# Patient Record
Sex: FEMALE | Race: WHITE | Employment: OTHER | URBAN - METROPOLITAN AREA
[De-identification: names, ages, dates, MRNs, and addresses within clinical notes are randomized per-mention and may not be internally consistent; named-entity substitution may affect disease eponyms.]

---

## 2017-02-28 ENCOUNTER — OFFICE VISIT (OUTPATIENT)
Dept: OBGYN CLINIC | Facility: CLINIC | Age: 52
End: 2017-02-28

## 2017-02-28 VITALS
HEIGHT: 67 IN | DIASTOLIC BLOOD PRESSURE: 66 MMHG | SYSTOLIC BLOOD PRESSURE: 118 MMHG | WEIGHT: 237 LBS | BODY MASS INDEX: 37.2 KG/M2

## 2017-02-28 DIAGNOSIS — Z01.419 ENCOUNTER FOR WELL WOMAN EXAM WITH ROUTINE GYNECOLOGICAL EXAM: Primary | ICD-10-CM

## 2017-02-28 PROCEDURE — 88175 CYTOPATH C/V AUTO FLUID REDO: CPT | Performed by: OBSTETRICS & GYNECOLOGY

## 2017-02-28 PROCEDURE — 87624 HPV HI-RISK TYP POOLED RSLT: CPT | Performed by: OBSTETRICS & GYNECOLOGY

## 2017-02-28 PROCEDURE — 99396 PREV VISIT EST AGE 40-64: CPT | Performed by: OBSTETRICS & GYNECOLOGY

## 2017-02-28 RX ORDER — SPIRONOLACTONE 25 MG/1
TABLET ORAL
Refills: 0 | COMMUNITY
Start: 2017-01-20 | End: 2018-03-01

## 2017-02-28 NOTE — PROGRESS NOTES
Marc Medeiros is a 49year old female No obstetric history on file. Patient's last menstrual period was 06/01/2016 (approximate).  Patient presents with:  Wellness Visit: annual exam  .  No complaints, LMP-6/10/16- discussed menopause    OBSTETRICS HIS back pain. Skin/Breast:  Denies any breast pain, lumps, or discharge. Neurological:  denies headaches, extremity weakness or numbness. Psychiatric: denies depression or anxiety. Endocrine:   denies excessive thirst or urination.   Heme/Lymph:  denies h

## 2017-03-02 LAB — HPV I/H RISK 1 DNA SPEC QL NAA+PROBE: NEGATIVE

## 2017-06-11 ENCOUNTER — HOSPITAL ENCOUNTER (OUTPATIENT)
Age: 52
Discharge: HOME | End: 2017-06-11
Attending: EMERGENCY MEDICINE
Payer: COMMERCIAL

## 2017-06-11 VITALS
HEART RATE: 88 BPM | BODY MASS INDEX: 36 KG/M2 | SYSTOLIC BLOOD PRESSURE: 132 MMHG | TEMPERATURE: 99 F | DIASTOLIC BLOOD PRESSURE: 84 MMHG | OXYGEN SATURATION: 97 % | RESPIRATION RATE: 16 BRPM | WEIGHT: 230 LBS

## 2017-06-11 DIAGNOSIS — M54.16 LUMBAR RADICULOPATHY: Primary | ICD-10-CM

## 2017-06-11 PROCEDURE — 87086 URINE CULTURE/COLONY COUNT: CPT | Performed by: EMERGENCY MEDICINE

## 2017-06-11 PROCEDURE — 99214 OFFICE O/P EST MOD 30 MIN: CPT

## 2017-06-11 PROCEDURE — 99204 OFFICE O/P NEW MOD 45 MIN: CPT

## 2017-06-11 PROCEDURE — 81002 URINALYSIS NONAUTO W/O SCOPE: CPT | Performed by: EMERGENCY MEDICINE

## 2017-06-11 RX ORDER — CYCLOBENZAPRINE HCL 5 MG
TABLET ORAL 3 TIMES DAILY PRN
Qty: 30 TABLET | Refills: 0 | Status: SHIPPED | OUTPATIENT
Start: 2017-06-11 | End: 2018-03-01

## 2017-06-11 NOTE — ED PROVIDER NOTES
Patient presents with: Other    HPI:     Chet Tenorio is a 49year old female who presents with chief complaint of leg pain. On 6/7 pt developed a headache that lasted for several days. She states it has since resolved.   However on the second da 5/5.    Diagnostics:     Labs Reviewed   POCT URINALYSIS DIPSTICK - Abnormal; Notable for the following:     Urine Clarity Cloudy (*)     Blood, Urine Trace-lysed (*)     Leukocyte esterase urine Trace (*)     All other components within normal limits   UR

## 2017-06-11 NOTE — ED INITIAL ASSESSMENT (HPI)
Patient c/o RLS type symptoms that started last Thursday. States at night shell be laying in bed and the urge to move around or move her legs starts up and causes her to not sleep.  Patient also mentions she thinks she may have had a migraine last Wednesday

## 2018-03-01 ENCOUNTER — OFFICE VISIT (OUTPATIENT)
Dept: OBGYN CLINIC | Facility: CLINIC | Age: 53
End: 2018-03-01

## 2018-03-01 VITALS
WEIGHT: 236 LBS | RESPIRATION RATE: 18 BRPM | SYSTOLIC BLOOD PRESSURE: 130 MMHG | HEIGHT: 67 IN | BODY MASS INDEX: 37.04 KG/M2 | DIASTOLIC BLOOD PRESSURE: 82 MMHG | HEART RATE: 88 BPM

## 2018-03-01 DIAGNOSIS — Z12.4 CERVICAL CANCER SCREENING: ICD-10-CM

## 2018-03-01 DIAGNOSIS — Z12.39 BREAST CANCER SCREENING: ICD-10-CM

## 2018-03-01 DIAGNOSIS — Z01.419 ENCOUNTER FOR WELL WOMAN EXAM WITH ROUTINE GYNECOLOGICAL EXAM: Primary | ICD-10-CM

## 2018-03-01 PROCEDURE — 88175 CYTOPATH C/V AUTO FLUID REDO: CPT | Performed by: OBSTETRICS & GYNECOLOGY

## 2018-03-01 PROCEDURE — 99396 PREV VISIT EST AGE 40-64: CPT | Performed by: OBSTETRICS & GYNECOLOGY

## 2018-03-01 PROCEDURE — 87624 HPV HI-RISK TYP POOLED RSLT: CPT | Performed by: OBSTETRICS & GYNECOLOGY

## 2018-03-01 NOTE — PROGRESS NOTES
Nelia Hong is a 47year old female No obstetric history on file. Patient's last menstrual period was 10/01/2017 (approximate).  Patient presents with:  Wellness Visit: annual  .  C/o hot flashes and night sweats for the past year, would like natur denies back pain. Skin/Breast:  Denies any breast pain, lumps, or discharge. Neurological:  denies headaches, extremity weakness or numbness. Psychiatric: denies depression or anxiety. Endocrine:   denies excessive thirst or urination.   Heme/Lymph:

## 2018-03-02 LAB — HPV I/H RISK 1 DNA SPEC QL NAA+PROBE: NEGATIVE

## 2018-05-02 ENCOUNTER — HOSPITAL ENCOUNTER (OUTPATIENT)
Dept: MAMMOGRAPHY | Age: 53
Discharge: HOME | End: 2018-05-02
Attending: OBSTETRICS & GYNECOLOGY
Payer: COMMERCIAL

## 2018-05-02 DIAGNOSIS — Z12.39 BREAST CANCER SCREENING: ICD-10-CM

## 2018-05-02 PROCEDURE — 77067 SCR MAMMO BI INCL CAD: CPT | Performed by: OBSTETRICS & GYNECOLOGY

## 2019-03-07 ENCOUNTER — OFFICE VISIT (OUTPATIENT)
Dept: OBGYN CLINIC | Facility: CLINIC | Age: 54
End: 2019-03-07
Payer: COMMERCIAL

## 2019-03-07 VITALS
HEIGHT: 67 IN | SYSTOLIC BLOOD PRESSURE: 128 MMHG | HEART RATE: 96 BPM | BODY MASS INDEX: 37.04 KG/M2 | DIASTOLIC BLOOD PRESSURE: 68 MMHG | WEIGHT: 236 LBS

## 2019-03-07 DIAGNOSIS — Z12.4 CERVICAL CANCER SCREENING: ICD-10-CM

## 2019-03-07 DIAGNOSIS — Z12.39 BREAST CANCER SCREENING: ICD-10-CM

## 2019-03-07 DIAGNOSIS — Z01.419 ENCOUNTER FOR WELL WOMAN EXAM WITH ROUTINE GYNECOLOGICAL EXAM: Primary | ICD-10-CM

## 2019-03-07 PROCEDURE — 88175 CYTOPATH C/V AUTO FLUID REDO: CPT | Performed by: OBSTETRICS & GYNECOLOGY

## 2019-03-07 PROCEDURE — 99396 PREV VISIT EST AGE 40-64: CPT | Performed by: OBSTETRICS & GYNECOLOGY

## 2019-03-07 PROCEDURE — 87624 HPV HI-RISK TYP POOLED RSLT: CPT | Performed by: OBSTETRICS & GYNECOLOGY

## 2019-03-07 RX ORDER — BETAMETHASONE DIPROPIONATE 0.5 MG/G
CREAM TOPICAL
COMMUNITY
Start: 2017-03-09 | End: 2019-09-17 | Stop reason: ALTCHOICE

## 2019-03-07 NOTE — PROGRESS NOTES
Angel Smith is a 47year old female  Patient's last menstrual period was 10/17/2018 (approximate). Patient presents with:  Wellness Visit  .   No complaints, taking Relizen for hot flashes, helps a lot  OBSTETRICS HISTORY:  OB History   Grav file        Attends meetings of clubs or organizations: Not on file        Relationship status: Not on file      Intimate partner violence:        Fear of current or ex partner: Not on file        Emotionally abused: Not on file        Physically abused: N pain, lumps, or discharge. Neurological:  denies headaches, extremity weakness or numbness. Psychiatric: denies depression or anxiety. Endocrine:   denies excessive thirst or urination. Heme/Lymph:  denies history of anemia, easy bruising or bleeding.

## 2019-03-08 LAB — HPV I/H RISK 1 DNA SPEC QL NAA+PROBE: NEGATIVE

## 2019-05-02 ENCOUNTER — OFFICE VISIT (OUTPATIENT)
Dept: INTERNAL MEDICINE CLINIC | Facility: CLINIC | Age: 54
End: 2019-05-02
Payer: COMMERCIAL

## 2019-05-02 VITALS
BODY MASS INDEX: 38.17 KG/M2 | SYSTOLIC BLOOD PRESSURE: 131 MMHG | HEIGHT: 66.25 IN | RESPIRATION RATE: 18 BRPM | HEART RATE: 73 BPM | WEIGHT: 237.5 LBS | DIASTOLIC BLOOD PRESSURE: 86 MMHG | TEMPERATURE: 99 F

## 2019-05-02 DIAGNOSIS — L92.0 GRANULOMA ANNULARE: ICD-10-CM

## 2019-05-02 DIAGNOSIS — Z12.31 VISIT FOR SCREENING MAMMOGRAM: ICD-10-CM

## 2019-05-02 DIAGNOSIS — J34.3 NASAL TURBINATE HYPERTROPHY: Primary | ICD-10-CM

## 2019-05-02 DIAGNOSIS — E04.1 THYROID NODULE: ICD-10-CM

## 2019-05-02 DIAGNOSIS — E66.9 OBESITY (BMI 35.0-39.9 WITHOUT COMORBIDITY): ICD-10-CM

## 2019-05-02 DIAGNOSIS — I10 ESSENTIAL HYPERTENSION: ICD-10-CM

## 2019-05-02 PROCEDURE — 99212 OFFICE O/P EST SF 10 MIN: CPT | Performed by: INTERNAL MEDICINE

## 2019-05-02 PROCEDURE — 99205 OFFICE O/P NEW HI 60 MIN: CPT | Performed by: INTERNAL MEDICINE

## 2019-05-02 RX ORDER — TURMERIC 100 %
POWDER (GRAM) MISCELLANEOUS DAILY
COMMUNITY

## 2019-05-02 RX ORDER — AMLODIPINE BESYLATE 5 MG/1
5 TABLET ORAL DAILY
Qty: 30 TABLET | Refills: 3 | Status: SHIPPED | OUTPATIENT
Start: 2019-05-02 | End: 2019-08-06

## 2019-05-02 RX ORDER — PYRANTEL PAMOATE 100 %
POWDER (GRAM) MISCELLANEOUS DAILY
COMMUNITY
End: 2019-09-05

## 2019-05-02 RX ORDER — LISINOPRIL 5 MG/1
5 TABLET ORAL DAILY
COMMUNITY
End: 2019-07-02

## 2019-05-02 RX ORDER — UBIDECARENONE 200 MG
200 CAPSULE ORAL DAILY
COMMUNITY
End: 2019-10-22 | Stop reason: ALTCHOICE

## 2019-05-02 RX ORDER — BLUE-GREEN ALGAE 500 MG
1 TABLET ORAL DAILY
COMMUNITY
End: 2019-08-05

## 2019-05-02 NOTE — PROGRESS NOTES
HPI:    Patient ID: Dixon Moses is a 47year old female. New patient, establishment of care. Has brought in records from earlier 1425 Dequ[address] Morales, holistic physician–Dr. Nicole Weathers, dermatologist.  Recently diagnosed with granuloma annulare. Allergic/Immunologic: Negative. Neurological: Negative. Hematological: Negative. Psychiatric/Behavioral: Negative. Current Outpatient Medications:  lisinopril 5 MG Oral Tab Take 5 mg by mouth daily.  Disp:  Rfl:    Ascorbic Acid (C- no discharge. Neck: Normal range of motion. Neck supple. No JVD present. No thyromegaly present. Cardiovascular: Normal rate, regular rhythm, normal heart sounds and intact distal pulses. No murmur heard.   Pulmonary/Chest: Effort normal and breath so been on local application of steroids for the past 3 months without any significant improvement.            Relevant Orders    SED RATE, WESTERGREN (AUTOMATED)    ALEX, DIRECT SCREEN    Essential hypertension     Blood pressure 131/86, pulse 73, temperature weeks (around 6/13/2019).     PT UNDERSTANDS AND AGREES TO FOLLOW DIRECTIONS AND ADVICE    Orders Placed This Encounter      TSH W Reflex To Free T4 [E]      Thyroxine, Free [E]      Comp Metabolic Panel (14) [E]      CBC W Differential W Platelet [E]

## 2019-05-02 NOTE — ASSESSMENT & PLAN NOTE
Blood pressure 131/86, pulse 73, temperature 99.4 °F (37.4 °C), temperature source Oral, resp. rate 18, height 5' 6.25\" (1.683 m), weight 237 lb 8 oz (107.7 kg), last menstrual period 10/01/2018, not currently breastfeeding.      Pressures–systolic look st

## 2019-05-02 NOTE — PATIENT INSTRUCTIONS
Problem List Items Addressed This Visit        Unprioritized    Essential hypertension     Blood pressure 131/86, pulse 73, temperature 99.4 °F (37.4 °C), temperature source Oral, resp.  rate 18, height 5' 6.25\" (1.683 m), weight 237 lb 8 oz (107.7 kg), la Flonase if necessary. Obesity (BMI 35.0-39.9 without comorbidity)     Ongoing problems with weight gain– has just started the keto diet. Recommended low-fat keto. Discussed reduction of salt in the diet–olives, pickles, pretzels and pizza.   Reduc

## 2019-05-02 NOTE — ASSESSMENT & PLAN NOTE
Nasal congestion, recent sinus infection. On examination continued nasal turbinate hypertrophy noted. She has slight tenderness to palpation in the maxillary sinuses. She has had chronic pressure across her forehead.   She is advised to consider starting

## 2019-05-02 NOTE — ASSESSMENT & PLAN NOTE
Discussed granuloma annulare and autoimmune etiology. Patient has been advised to try changing the lisinopril to amlodipine at 5 mg daily. Consider a biopsy to evaluate for other causes for the rash.   She has been on local application of steroids for the

## 2019-05-02 NOTE — ASSESSMENT & PLAN NOTE
Ongoing problems with weight gain– has just started the keto diet. Recommended low-fat keto. Discussed reduction of salt in the diet–olives, pickles, pretzels and pizza. Reduce starches that is fruits, roots, grains in the diet.   Increase vegetables in

## 2019-05-02 NOTE — ASSESSMENT & PLAN NOTE
Patient has been evaluated per Dr. Bryce De La Garza in. Ultrasounds as well as labs have been stable. Continue to monitor.

## 2019-05-03 ENCOUNTER — MED REC SCAN ONLY (OUTPATIENT)
Dept: INTERNAL MEDICINE CLINIC | Facility: CLINIC | Age: 54
End: 2019-05-03

## 2019-05-03 RX ORDER — AMLODIPINE BESYLATE 5 MG/1
5 TABLET ORAL DAILY
Qty: 90 TABLET | Refills: 3 | OUTPATIENT
Start: 2019-05-03 | End: 2020-04-27

## 2019-05-13 ENCOUNTER — OFFICE VISIT (OUTPATIENT)
Dept: DERMATOLOGY | Age: 54
End: 2019-05-13

## 2019-05-13 DIAGNOSIS — L92.0 GA (GRANULOMA ANNULARE): Primary | ICD-10-CM

## 2019-05-13 PROCEDURE — 99213 OFFICE O/P EST LOW 20 MIN: CPT | Performed by: DERMATOLOGY

## 2019-05-13 RX ORDER — TURMERIC 100 %
POWDER (GRAM) MISCELLANEOUS
COMMUNITY

## 2019-05-13 RX ORDER — PYRANTEL PAMOATE 100 %
POWDER (GRAM) MISCELLANEOUS
COMMUNITY

## 2019-05-13 RX ORDER — MULTIVIT-MIN/IRON/FOLIC ACID/K 18-600-40
1 CAPSULE ORAL
COMMUNITY

## 2019-05-13 RX ORDER — UBIDECARENONE 200 MG
200 CAPSULE ORAL
COMMUNITY

## 2019-05-13 RX ORDER — AMLODIPINE BESYLATE 5 MG/1
5 TABLET ORAL
COMMUNITY
Start: 2019-05-02 | End: 2020-04-26

## 2019-05-13 RX ORDER — FOLIC ACID 0.8 MG
3 TABLET ORAL
COMMUNITY

## 2019-05-13 RX ORDER — BLUE-GREEN ALGAE 500 MG
1 TABLET ORAL
COMMUNITY

## 2019-06-27 ENCOUNTER — HOSPITAL ENCOUNTER (OUTPATIENT)
Dept: MAMMOGRAPHY | Facility: HOSPITAL | Age: 54
Discharge: HOME | End: 2019-06-27
Attending: INTERNAL MEDICINE
Payer: COMMERCIAL

## 2019-06-27 ENCOUNTER — LAB ENCOUNTER (OUTPATIENT)
Dept: LAB | Facility: HOSPITAL | Age: 54
End: 2019-06-27
Attending: INTERNAL MEDICINE
Payer: COMMERCIAL

## 2019-06-27 DIAGNOSIS — Z12.31 VISIT FOR SCREENING MAMMOGRAM: ICD-10-CM

## 2019-06-27 DIAGNOSIS — E04.1 THYROID NODULE: ICD-10-CM

## 2019-06-27 DIAGNOSIS — I10 ESSENTIAL HYPERTENSION: ICD-10-CM

## 2019-06-27 DIAGNOSIS — L92.0 GRANULOMA ANNULARE: ICD-10-CM

## 2019-06-27 PROCEDURE — 77063 BREAST TOMOSYNTHESIS BI: CPT | Performed by: INTERNAL MEDICINE

## 2019-06-27 PROCEDURE — 84439 ASSAY OF FREE THYROXINE: CPT

## 2019-06-27 PROCEDURE — 36415 COLL VENOUS BLD VENIPUNCTURE: CPT

## 2019-06-27 PROCEDURE — 85652 RBC SED RATE AUTOMATED: CPT

## 2019-06-27 PROCEDURE — 86039 ANTINUCLEAR ANTIBODIES (ANA): CPT

## 2019-06-27 PROCEDURE — 77067 SCR MAMMO BI INCL CAD: CPT | Performed by: INTERNAL MEDICINE

## 2019-06-27 PROCEDURE — 86038 ANTINUCLEAR ANTIBODIES: CPT

## 2019-06-27 PROCEDURE — 80061 LIPID PANEL: CPT

## 2019-06-27 PROCEDURE — 80053 COMPREHEN METABOLIC PANEL: CPT

## 2019-06-27 PROCEDURE — 85025 COMPLETE CBC W/AUTO DIFF WBC: CPT

## 2019-06-27 PROCEDURE — 84443 ASSAY THYROID STIM HORMONE: CPT

## 2019-07-01 LAB — NUCLEAR IGG TITR SER IF: POSITIVE {TITER}

## 2019-07-02 ENCOUNTER — OFFICE VISIT (OUTPATIENT)
Dept: INTERNAL MEDICINE CLINIC | Facility: CLINIC | Age: 54
End: 2019-07-02
Payer: COMMERCIAL

## 2019-07-02 VITALS
DIASTOLIC BLOOD PRESSURE: 85 MMHG | SYSTOLIC BLOOD PRESSURE: 135 MMHG | WEIGHT: 240 LBS | HEART RATE: 69 BPM | HEIGHT: 67 IN | RESPIRATION RATE: 16 BRPM | BODY MASS INDEX: 37.67 KG/M2

## 2019-07-02 DIAGNOSIS — I10 ESSENTIAL HYPERTENSION: ICD-10-CM

## 2019-07-02 DIAGNOSIS — L92.0 GRANULOMA ANNULARE: ICD-10-CM

## 2019-07-02 DIAGNOSIS — E66.9 OBESITY (BMI 35.0-39.9 WITHOUT COMORBIDITY): ICD-10-CM

## 2019-07-02 DIAGNOSIS — E04.1 THYROID NODULE: Primary | ICD-10-CM

## 2019-07-02 LAB — ANA NUCLEOLAR TITR SER IF: 80 {TITER}

## 2019-07-02 PROCEDURE — 99214 OFFICE O/P EST MOD 30 MIN: CPT | Performed by: INTERNAL MEDICINE

## 2019-07-02 NOTE — ASSESSMENT & PLAN NOTE
Blood pressure 135/85, pulse 69, resp. rate 16, height 5' 7\" (1.702 m), weight 240 lb (108.9 kg), not currently breastfeeding. Patient is currently on amlodipine at 5 mg daily instead of lisinopril.   This was done to rule out the possibility of lisinopri

## 2019-07-02 NOTE — PATIENT INSTRUCTIONS
Problem List Items Addressed This Visit        Unprioritized    Essential hypertension     Blood pressure 135/85, pulse 69, resp. rate 16, height 5' 7\" (1.702 m), weight 240 lb (108.9 kg), not currently breastfeeding.   Patient is currently on amlodipine a THYROID ([id number])

## 2019-07-02 NOTE — ASSESSMENT & PLAN NOTE
Blood pressure 135/85, pulse 69, resp. rate 16, height 5' 7\" (1.702 m), weight 240 lb (108.9 kg), not currently breastfeeding. Has had ultrasounds and labs completed per Dr. Nidia Allen. Due for repeat ultrasound. Orders provided.

## 2019-07-02 NOTE — ASSESSMENT & PLAN NOTE
Wt Readings from Last 6 Encounters:  07/02/19 : 240 lb (108.9 kg)  05/02/19 : 237 lb 8 oz (107.7 kg)  03/07/19 : 236 lb (107 kg)  03/01/18 : 236 lb (107 kg)  06/11/17 : 230 lb (104.3 kg)  02/28/17 : 237 lb (107.5 kg)     Ongoing issues with weight gain, co

## 2019-07-02 NOTE — PROGRESS NOTES
HPI:    Patient ID: Edwin Wilson is a 49year old female. Written by Latasha Garvey MD on 7/1/2019 10:07 PM   The cholesterol panel shows slight elevation in the LDL cholesterol.    The thyroid function test look normal.   The blood counts look n HENT: Negative. Eyes: Negative. Respiratory: Negative. Cardiovascular: Negative. Negative for palpitations and orthopnea. Gastrointestinal: Negative. Endocrine: Negative. Genitourinary: Negative. Musculoskeletal: Negative.     Skin: N Oropharynx is clear and moist. No oropharyngeal exudate. Eyes: Pupils are equal, round, and reactive to light. Conjunctivae and EOM are normal. Right eye exhibits no discharge. Left eye exhibits no discharge. Neck: Normal range of motion. Neck supple. possibility of lisinopril causing her current rash. Blood pressure is not yet adequately controlled but will advise to reduce the salt in the diet, try of weight loss and consider additional medications if necessary in the next few months.   Labs just comp

## 2019-07-02 NOTE — ASSESSMENT & PLAN NOTE
Currently not on oral medications. Local betamethasone as needed.   F/u with dr Salima Tai as discussed

## 2019-07-09 ENCOUNTER — TELEPHONE (OUTPATIENT)
Dept: DERMATOLOGY | Age: 54
End: 2019-07-09

## 2019-08-01 ENCOUNTER — HOSPITAL ENCOUNTER (OUTPATIENT)
Dept: ULTRASOUND IMAGING | Facility: HOSPITAL | Age: 54
Discharge: HOME | End: 2019-08-01
Attending: INTERNAL MEDICINE
Payer: COMMERCIAL

## 2019-08-01 ENCOUNTER — APPOINTMENT (OUTPATIENT)
Dept: LAB | Facility: HOSPITAL | Age: 54
End: 2019-08-01
Attending: INTERNAL MEDICINE
Payer: COMMERCIAL

## 2019-08-01 ENCOUNTER — HOSPITAL ENCOUNTER (OUTPATIENT)
Dept: MAMMOGRAPHY | Facility: HOSPITAL | Age: 54
Discharge: HOME | End: 2019-08-01
Attending: INTERNAL MEDICINE
Payer: COMMERCIAL

## 2019-08-01 DIAGNOSIS — R92.8 ABNORMAL MAMMOGRAM: ICD-10-CM

## 2019-08-01 DIAGNOSIS — E66.9 OBESITY (BMI 35.0-39.9 WITHOUT COMORBIDITY): ICD-10-CM

## 2019-08-01 LAB
ALBUMIN SERPL-MCNC: 4.2 G/DL (ref 3.4–5)
ALBUMIN/GLOB SERPL: 1.2 {RATIO} (ref 1–2)
ALP LIVER SERPL-CCNC: 101 U/L (ref 41–108)
ALT SERPL-CCNC: 29 U/L (ref 13–56)
ANION GAP SERPL CALC-SCNC: 6 MMOL/L (ref 0–18)
AST SERPL-CCNC: 26 U/L (ref 15–37)
BILIRUB SERPL-MCNC: 0.8 MG/DL (ref 0.1–2)
BUN BLD-MCNC: 16 MG/DL (ref 7–18)
BUN/CREAT SERPL: 18.6 (ref 10–20)
CALCIUM BLD-MCNC: 9.3 MG/DL (ref 8.5–10.1)
CHLORIDE SERPL-SCNC: 104 MMOL/L (ref 98–112)
CO2 SERPL-SCNC: 29 MMOL/L (ref 21–32)
CREAT BLD-MCNC: 0.86 MG/DL (ref 0.55–1.02)
GLOBULIN PLAS-MCNC: 3.4 G/DL (ref 2.8–4.4)
GLUCOSE BLD-MCNC: 92 MG/DL (ref 70–99)
INSULIN SERPL-ACNC: 5.9 MU/L (ref 3–25)
M PROTEIN MFR SERPL ELPH: 7.6 G/DL (ref 6.4–8.2)
OSMOLALITY SERPL CALC.SUM OF ELEC: 289 MOSM/KG (ref 275–295)
PATIENT FASTING: YES
POTASSIUM SERPL-SCNC: 3.7 MMOL/L (ref 3.5–5.1)
SODIUM SERPL-SCNC: 139 MMOL/L (ref 136–145)

## 2019-08-01 PROCEDURE — 82397 CHEMILUMINESCENT ASSAY: CPT

## 2019-08-01 PROCEDURE — 76642 ULTRASOUND BREAST LIMITED: CPT | Performed by: INTERNAL MEDICINE

## 2019-08-01 PROCEDURE — 83525 ASSAY OF INSULIN: CPT | Performed by: INTERNAL MEDICINE

## 2019-08-01 PROCEDURE — 80053 COMPREHEN METABOLIC PANEL: CPT | Performed by: INTERNAL MEDICINE

## 2019-08-01 PROCEDURE — 77061 BREAST TOMOSYNTHESIS UNI: CPT | Performed by: INTERNAL MEDICINE

## 2019-08-01 PROCEDURE — 77065 DX MAMMO INCL CAD UNI: CPT | Performed by: INTERNAL MEDICINE

## 2019-08-01 PROCEDURE — 36415 COLL VENOUS BLD VENIPUNCTURE: CPT | Performed by: INTERNAL MEDICINE

## 2019-08-05 NOTE — IMAGING NOTE
This nurse introduced self and role of breast coordinator. Discussed recommended breast biopsy with patient. Pt was recommended by  Dr Marisela Limon  to have a  left breast ultrasound guided biopsy.  She will stop tumeric coq 10 probiotics last dose was Thurs HIPS OR), Take 1 tablet by mouth daily. , Disp: , Rfl:   •  Multiple Vitamins-Minerals (MULTIVITAMIN ADULT OR), Take 1 tablet by mouth daily. , Disp: , Rfl:   •  Cholecalciferol 5000 units Oral Tab, Take 3 tablets by mouth daily. , Disp: , Rfl:   •  Iodine St procedure, as below. You will be lying on your back, potentially slightly toward one side, for this procedure. The US technician will use the ultrasound machine to locate the area in question that was seen on your previous breast imaging.   The Radiologis will be available within 2-3 business days of their biopsy. Discussed results will be communicated by their ordering physician unless otherwise indicated.   Educated patient that once we receive an order from her physician  our radiology secretaries would

## 2019-08-06 ENCOUNTER — TELEPHONE (OUTPATIENT)
Dept: INTERNAL MEDICINE CLINIC | Facility: CLINIC | Age: 54
End: 2019-08-06

## 2019-08-06 ENCOUNTER — TELEPHONE (OUTPATIENT)
Dept: OTHER | Age: 54
End: 2019-08-06

## 2019-08-06 ENCOUNTER — TELEPHONE (OUTPATIENT)
Dept: MAMMOGRAPHY | Facility: HOSPITAL | Age: 54
End: 2019-08-06

## 2019-08-06 ENCOUNTER — NURSE NAVIGATOR ENCOUNTER (OUTPATIENT)
Dept: MAMMOGRAPHY | Facility: HOSPITAL | Age: 54
End: 2019-08-06

## 2019-08-06 DIAGNOSIS — R92.8 ABNORMAL MAMMOGRAM OF LEFT BREAST: Primary | ICD-10-CM

## 2019-08-06 LAB — LEPTIN: 19.9 NG/ML

## 2019-08-06 RX ORDER — AMLODIPINE BESYLATE 5 MG/1
5 TABLET ORAL DAILY
Qty: 90 TABLET | Refills: 1 | Status: SHIPPED | OUTPATIENT
Start: 2019-08-06 | End: 2020-01-26

## 2019-08-06 NOTE — TELEPHONE ENCOUNTER
Referral and signed order by physician faxed to [facility] [phone] Radiology dept.

## 2019-08-06 NOTE — TELEPHONE ENCOUNTER
Called Dr Corrine Duarte' office spoke with [facility] requesting order for Araceli Nathan  Ultrasound guided left breast with clip placement. Tracy to send request for order to Dr Nate Busby now,.

## 2019-08-06 NOTE — TELEPHONE ENCOUNTER
Pt called in requesting to have pending refills for the following medication to be sent to her [facility] pharmacy. Please advise      Current Outpatient Medications:     •  amLODIPine Besylate 5 MG Oral Tab, Take 1 tablet (5 mg total) by mouth daily. , Disp: 30

## 2019-08-06 NOTE — TELEPHONE ENCOUNTER
Refill passed per [facility] protocol.   Hypertensive Medications  Protocol Criteria:  · Appointment scheduled in the past 6 months or in the next 3 months  · BMP or CMP in the past 12 months  · Creatinine result < 2  Recent Outpatient Visits

## 2019-08-14 ENCOUNTER — OFFICE VISIT (OUTPATIENT)
Dept: INTERNAL MEDICINE CLINIC | Facility: CLINIC | Age: 54
End: 2019-08-14
Payer: COMMERCIAL

## 2019-08-14 VITALS
HEART RATE: 69 BPM | BODY MASS INDEX: 37.98 KG/M2 | HEIGHT: 67 IN | RESPIRATION RATE: 16 BRPM | SYSTOLIC BLOOD PRESSURE: 127 MMHG | DIASTOLIC BLOOD PRESSURE: 85 MMHG | WEIGHT: 242 LBS

## 2019-08-14 DIAGNOSIS — E66.9 OBESITY (BMI 35.0-39.9 WITHOUT COMORBIDITY): ICD-10-CM

## 2019-08-14 DIAGNOSIS — I10 ESSENTIAL HYPERTENSION: Primary | ICD-10-CM

## 2019-08-14 DIAGNOSIS — R92.8 ABNORMAL MAMMOGRAM: ICD-10-CM

## 2019-08-14 DIAGNOSIS — E88.81 INSULIN RESISTANCE: ICD-10-CM

## 2019-08-14 PROCEDURE — 99214 OFFICE O/P EST MOD 30 MIN: CPT | Performed by: INTERNAL MEDICINE

## 2019-08-14 PROCEDURE — 99212 OFFICE O/P EST SF 10 MIN: CPT | Performed by: INTERNAL MEDICINE

## 2019-08-14 RX ORDER — METFORMIN HYDROCHLORIDE 750 MG/1
750 TABLET, EXTENDED RELEASE ORAL DAILY
Qty: 30 TABLET | Refills: 5 | Status: SHIPPED | OUTPATIENT
Start: 2019-08-14 | End: 2019-12-09

## 2019-08-14 NOTE — ASSESSMENT & PLAN NOTE
Blood pressure 127/85, pulse 69, resp. rate 16, height 5' 7\" (1.702 m), weight 242 lb (109.8 kg), not currently breastfeeding. Blood pressure looks much improved. Currently on amlodipine at 5 mg daily in place of lisinopril.   She has tolerated the medic

## 2019-08-14 NOTE — PROGRESS NOTES
HPI:    Patient ID: Theodore Hoang is a 49year old female.     Result Notes for [facility] VANNESA 2D+3D DIAGNOSTIC ADDL VWS LEFT (CPT=77065/08869)     Notes recorded by Lex Maria RN on 2019 at 12:31 PM CDT  Verified name and .  Results/recommen on 8/6/2019 at 10:24 AM CDT  Leptin levels are elevated, we will discuss management at the next office visit. Hypertension   This is a chronic problem. The current episode started more than 1 year ago.  The problem has been gradually improving [facility] Medications:  metFORMIN HCl  MG Oral Tablet 24 Hr Take 1 tablet (750 mg total) by mouth daily. Disp: 30 tablet Rfl: 5   amLODIPine Besylate 5 MG Oral Tab Take 1 tablet (5 mg total) by mouth daily.  Disp: 90 tablet Rfl: 1   Ascorbic Acid (C-500/TORRES HI reactive to light. Conjunctivae and EOM are normal. Right eye exhibits no discharge. Left eye exhibits no discharge. Neck: Normal range of motion. Neck supple. No JVD present. No thyromegaly present.    Cardiovascular: Normal rate, regular rhythm, normal months. Relevant Orders    COMP METABOLIC PANEL (14)    HEMOGLOBIN A1C    INSULIN    Abnormal mammogram     Diagnostic mammogram and ultrasound of the left breast showed scattered fibroglandular densities.   After radiology review of the mammogram i

## 2019-08-14 NOTE — ASSESSMENT & PLAN NOTE
Diagnostic mammogram and ultrasound of the left breast showed scattered fibroglandular densities. After radiology review of the mammogram it was decided that a left breast biopsy to evaluate the tissue would be needed.   Patient has been advised to complet

## 2019-08-14 NOTE — PATIENT INSTRUCTIONS
Problem List Items Addressed This Visit        Unprioritized    Abnormal mammogram     Diagnostic mammogram and ultrasound of the left breast showed scattered fibroglandular densities.   After radiology review of the mammogram it was decided that a left yaz

## 2019-08-14 NOTE — ASSESSMENT & PLAN NOTE
Labs discussed–slight insulin resistance associated with elevated leptin levels. Consider starting on metformin 500 mg 1 tablet once daily with dinner. Recheck labs in about 3 months. Strict diet control of fatty foods, fried foods, sugars and desserts.

## 2019-08-23 ENCOUNTER — HOSPITAL ENCOUNTER (OUTPATIENT)
Dept: ULTRASOUND IMAGING | Facility: HOSPITAL | Age: 54
Discharge: HOME | End: 2019-08-23
Attending: INTERNAL MEDICINE
Payer: COMMERCIAL

## 2019-08-23 ENCOUNTER — APPOINTMENT (OUTPATIENT)
Dept: MAMMOGRAPHY | Facility: HOSPITAL | Age: 54
End: 2019-08-23
Attending: INTERNAL MEDICINE
Payer: COMMERCIAL

## 2019-08-23 ENCOUNTER — HOSPITAL ENCOUNTER (OUTPATIENT)
Dept: MAMMOGRAPHY | Facility: HOSPITAL | Age: 54
Discharge: HOME | End: 2019-08-23
Attending: INTERNAL MEDICINE
Payer: COMMERCIAL

## 2019-08-23 VITALS — DIASTOLIC BLOOD PRESSURE: 82 MMHG | RESPIRATION RATE: 18 BRPM | SYSTOLIC BLOOD PRESSURE: 122 MMHG | HEART RATE: 70 BPM

## 2019-08-23 DIAGNOSIS — R92.8 ABNORMAL MAMMOGRAM OF LEFT BREAST: ICD-10-CM

## 2019-08-23 PROCEDURE — 77065 DX MAMMO INCL CAD UNI: CPT | Performed by: INTERNAL MEDICINE

## 2019-08-23 PROCEDURE — 88305 TISSUE EXAM BY PATHOLOGIST: CPT | Performed by: INTERNAL MEDICINE

## 2019-08-23 PROCEDURE — 19083 BX BREAST 1ST LESION US IMAG: CPT | Performed by: INTERNAL MEDICINE

## 2019-08-23 NOTE — PROCEDURES
[facility]  Procedure Note    Marcia Guppy Patient Status:  Outpatient    1965 MRN [id number]   Location [address] [facility] Attending Earle Barragan MD   Hosp Day # 0 PCP Jose Francisco Grider MD     Procedure: Veronica Finley

## 2019-08-23 NOTE — IMAGING NOTE
Identified with spelling of name and date of birth. Medications and allergies reviewed. Denies taking aspirin containing medications, NSAIDs, fish oil, vitamin E  5 days prior to biopsy.  Denies prescription anti coagulating medications 5 days prior to biop core taken at 915 am  Total amount cores taken 4 placed in formalin at 915 am    ribbon Clip placed at 915 am    917 am Procedure completed. Pressure to site . No active bleeding noted. Area cleaned steri strips to site.  Ice pack to site      923 amSpecim

## 2019-08-26 ENCOUNTER — TELEPHONE (OUTPATIENT)
Dept: SURGERY | Facility: CLINIC | Age: 54
End: 2019-08-26

## 2019-08-27 NOTE — IMAGING NOTE
Anushka Bass is s/p biopsy . Phoned  At [phone] and introduced myself as breast coordinator . Reinforced to patient  post biopsy care and instructions .     Informed  and shared the pathology results as well as the recommendations from Dr Pito Reinoso by (CST): Leonides Tavarez MD on 8/23/2019 at 9:31       Approved by (CST): Leonides Tavarez MD on 8/27/2019 at 7:04                 [id number] [facility] acknowledges the above and denies questions.  She was also instructed to perform breast self exams and if she

## 2019-09-03 ENCOUNTER — OFFICE VISIT (OUTPATIENT)
Dept: SURGERY | Facility: CLINIC | Age: 54
End: 2019-09-03
Payer: COMMERCIAL

## 2019-09-03 ENCOUNTER — MED REC SCAN ONLY (OUTPATIENT)
Dept: INTERNAL MEDICINE CLINIC | Facility: CLINIC | Age: 54
End: 2019-09-03

## 2019-09-03 VITALS
WEIGHT: 242 LBS | OXYGEN SATURATION: 95 % | DIASTOLIC BLOOD PRESSURE: 102 MMHG | BODY MASS INDEX: 37.98 KG/M2 | HEIGHT: 67 IN | RESPIRATION RATE: 16 BRPM | SYSTOLIC BLOOD PRESSURE: 150 MMHG | HEART RATE: 75 BPM

## 2019-09-03 DIAGNOSIS — R92.8 ABNORMAL MAMMOGRAM: ICD-10-CM

## 2019-09-03 DIAGNOSIS — N60.92 ATYPICAL DUCTAL HYPERPLASIA OF LEFT BREAST: Primary | ICD-10-CM

## 2019-09-03 DIAGNOSIS — Z01.818 PRE-OP TESTING: ICD-10-CM

## 2019-09-03 PROCEDURE — 99244 OFF/OP CNSLTJ NEW/EST MOD 40: CPT | Performed by: SURGERY

## 2019-09-03 NOTE — PROGRESS NOTES
Reviewed surgical instructions with patient. Reviewed pre op testing needed, and difference between seed and wire loc, and scheduling both. Questions addressed.

## 2019-09-03 NOTE — PROGRESS NOTES
Breast Surgery New Patient Consultation    This is the first visit for this 49year old woman, referred by Dr. Carolina Mix, who presents for evaluation of left breast atypical ductal hyperplasia.     History of Present Illness:   Ms. Franklin Andino is a 49 Disp:  Rfl:    metFORMIN HCl  MG Oral Tablet 24 Hr Take 1 tablet (750 mg total) by mouth daily. Disp: 30 tablet Rfl: 5   amLODIPine Besylate 5 MG Oral Tab Take 1 tablet (5 mg total) by mouth daily.  Disp: 90 tablet Rfl: 1   Ascorbic Acid (C-500/TORRES H +night sweats, fatigue, generalized weakness, change in appetite or weight loss. HEENT:     The patient denies eye irritation, cataracts, redness, glaucoma, yellowing of the eyes, change in vision or color blindness.  The patient denies hearing loss, rin trembling/tremors, fainting/black outs, dizziness, memory problems, loss of sensation/numbness, problems walking, weakness, tingling or burning in hands/feet.  There is no history of abusive relationship, bipolar disorder, sleep disturbance, anxiety, depres normal.    Abdomen: The abdomen is soft, flat and non tender. The liver is not enlarged. There are no palpable masses. Lymph Nodes: The supraclavicular, axillary and cervical regions are free of significant lymphadenopathy.     Back: There is no verteb to her satisfaction. She has been  encouraged to contact the office with any questions or concerns prior to her next appointment.

## 2019-09-03 NOTE — PATIENT INSTRUCTIONS
Surgeon: Dr Miley Green  [phone]  Fax [phone]  Procedure/Surgery:  Left breast wire/seed localized exicisional biopsy    [facility] [place], [address] [place] [address] Rd [phone]  1.  Someone must accompany you the day of the proce

## 2019-09-04 ENCOUNTER — TELEPHONE (OUTPATIENT)
Dept: SURGERY | Facility: CLINIC | Age: 54
End: 2019-09-04

## 2019-09-05 ENCOUNTER — TELEPHONE (OUTPATIENT)
Dept: MAMMOGRAPHY | Age: 54
End: 2019-09-05

## 2019-09-05 NOTE — TELEPHONE ENCOUNTER
LVM to call back, attempting to provide info on wire LOC scheduled for Monday 9/9/19 prior to surgery.

## 2019-09-05 NOTE — TELEPHONE ENCOUNTER
Spoke with pt and provided pre-procedure instructions for needle LOC scheduled for Monday prior to surgery.  I explained that pt will travel through the lobby to the [facility] to have LOC preformed by the Radiologist. Pt verbalized understanding, answered questio

## 2019-09-07 ENCOUNTER — APPOINTMENT (OUTPATIENT)
Dept: LAB | Facility: HOSPITAL | Age: 54
End: 2019-09-07
Payer: COMMERCIAL

## 2019-09-07 DIAGNOSIS — N60.92 ATYPICAL DUCTAL HYPERPLASIA OF LEFT BREAST: ICD-10-CM

## 2019-09-07 LAB
ATRIAL RATE: 66 BPM
P AXIS: 2 DEGREES
P-R INTERVAL: 150 MS
Q-T INTERVAL: 448 MS
QRS DURATION: 72 MS
QTC CALCULATION (BEZET): 469 MS
R AXIS: 0 DEGREES
T AXIS: 9 DEGREES
VENTRICULAR RATE: 66 BPM

## 2019-09-07 PROCEDURE — 93010 ELECTROCARDIOGRAM REPORT: CPT | Performed by: INTERNAL MEDICINE

## 2019-09-07 PROCEDURE — 93005 ELECTROCARDIOGRAM TRACING: CPT

## 2019-09-09 ENCOUNTER — ANESTHESIA EVENT (OUTPATIENT)
Dept: SURGERY | Facility: HOSPITAL | Age: 54
End: 2019-09-09
Payer: COMMERCIAL

## 2019-09-09 ENCOUNTER — ANESTHESIA (OUTPATIENT)
Dept: SURGERY | Facility: HOSPITAL | Age: 54
End: 2019-09-09
Payer: COMMERCIAL

## 2019-09-09 ENCOUNTER — APPOINTMENT (OUTPATIENT)
Dept: MAMMOGRAPHY | Facility: HOSPITAL | Age: 54
End: 2019-09-09
Attending: SURGERY
Payer: COMMERCIAL

## 2019-09-09 ENCOUNTER — HOSPITAL ENCOUNTER (OUTPATIENT)
Facility: HOSPITAL | Age: 54
Setting detail: HOSPITAL OUTPATIENT SURGERY
Discharge: HOME | End: 2019-09-09
Attending: SURGERY | Admitting: SURGERY
Payer: COMMERCIAL

## 2019-09-09 VITALS
HEART RATE: 57 BPM | DIASTOLIC BLOOD PRESSURE: 77 MMHG | RESPIRATION RATE: 16 BRPM | SYSTOLIC BLOOD PRESSURE: 111 MMHG | WEIGHT: 240.31 LBS | TEMPERATURE: 98 F | OXYGEN SATURATION: 96 % | HEIGHT: 68 IN | BODY MASS INDEX: 36.42 KG/M2

## 2019-09-09 DIAGNOSIS — N60.92 ATYPICAL DUCTAL HYPERPLASIA OF LEFT BREAST: Primary | ICD-10-CM

## 2019-09-09 LAB
POCT LOT NUMBER: NORMAL
POCT URINE PREGNANCY: NEGATIVE

## 2019-09-09 PROCEDURE — 81025 URINE PREGNANCY TEST: CPT | Performed by: SURGERY

## 2019-09-09 PROCEDURE — 76098 X-RAY EXAM SURGICAL SPECIMEN: CPT | Performed by: SURGERY

## 2019-09-09 PROCEDURE — 88360 TUMOR IMMUNOHISTOCHEM/MANUAL: CPT | Performed by: SURGERY

## 2019-09-09 PROCEDURE — 19281 PERQ DEVICE BREAST 1ST IMAG: CPT | Performed by: SURGERY

## 2019-09-09 PROCEDURE — 88307 TISSUE EXAM BY PATHOLOGIST: CPT | Performed by: SURGERY

## 2019-09-09 PROCEDURE — 0HBU0ZX EXCISION OF LEFT BREAST, OPEN APPROACH, DIAGNOSTIC: ICD-10-PCS | Performed by: SURGERY

## 2019-09-09 RX ORDER — MEPERIDINE HYDROCHLORIDE 25 MG/ML
12.5 INJECTION INTRAMUSCULAR; INTRAVENOUS; SUBCUTANEOUS AS NEEDED
Status: DISCONTINUED | OUTPATIENT
Start: 2019-09-09 | End: 2019-09-09

## 2019-09-09 RX ORDER — DIAZEPAM 5 MG/1
5 TABLET ORAL AS NEEDED
Status: DISCONTINUED | OUTPATIENT
Start: 2019-09-09 | End: 2019-09-09 | Stop reason: HOSPADM

## 2019-09-09 RX ORDER — DEXAMETHASONE SODIUM PHOSPHATE 4 MG/ML
4 VIAL (ML) INJECTION AS NEEDED
Status: DISCONTINUED | OUTPATIENT
Start: 2019-09-09 | End: 2019-09-09

## 2019-09-09 RX ORDER — ACETAMINOPHEN 500 MG
1000 TABLET ORAL ONCE
Status: DISCONTINUED | OUTPATIENT
Start: 2019-09-09 | End: 2019-09-09 | Stop reason: HOSPADM

## 2019-09-09 RX ORDER — ACETAMINOPHEN 500 MG
500 TABLET ORAL EVERY 6 HOURS PRN
COMMUNITY
End: 2019-09-17 | Stop reason: ALTCHOICE

## 2019-09-09 RX ORDER — ONDANSETRON 2 MG/ML
4 INJECTION INTRAMUSCULAR; INTRAVENOUS AS NEEDED
Status: DISCONTINUED | OUTPATIENT
Start: 2019-09-09 | End: 2019-09-09

## 2019-09-09 RX ORDER — NALOXONE HYDROCHLORIDE 0.4 MG/ML
80 INJECTION, SOLUTION INTRAMUSCULAR; INTRAVENOUS; SUBCUTANEOUS AS NEEDED
Status: DISCONTINUED | OUTPATIENT
Start: 2019-09-09 | End: 2019-09-09

## 2019-09-09 RX ORDER — LIDOCAINE HYDROCHLORIDE AND EPINEPHRINE 10; 10 MG/ML; UG/ML
INJECTION, SOLUTION INFILTRATION; PERINEURAL AS NEEDED
Status: DISCONTINUED | OUTPATIENT
Start: 2019-09-09 | End: 2019-09-09 | Stop reason: HOSPADM

## 2019-09-09 RX ORDER — LABETALOL HYDROCHLORIDE 5 MG/ML
5 INJECTION, SOLUTION INTRAVENOUS EVERY 5 MIN PRN
Status: DISCONTINUED | OUTPATIENT
Start: 2019-09-09 | End: 2019-09-09

## 2019-09-09 RX ORDER — HYDROCODONE BITARTRATE AND ACETAMINOPHEN 5; 325 MG/1; MG/1
1 TABLET ORAL AS NEEDED
Status: DISCONTINUED | OUTPATIENT
Start: 2019-09-09 | End: 2019-09-09

## 2019-09-09 RX ORDER — HYDROCODONE BITARTRATE AND ACETAMINOPHEN 5; 325 MG/1; MG/1
1-2 TABLET ORAL EVERY 6 HOURS PRN
Qty: 20 TABLET | Refills: 0 | Status: SHIPPED | OUTPATIENT
Start: 2019-09-09 | End: 2019-09-17 | Stop reason: ALTCHOICE

## 2019-09-09 RX ORDER — SODIUM CHLORIDE, SODIUM LACTATE, POTASSIUM CHLORIDE, CALCIUM CHLORIDE 600; 310; 30; 20 MG/100ML; MG/100ML; MG/100ML; MG/100ML
INJECTION, SOLUTION INTRAVENOUS CONTINUOUS
Status: DISCONTINUED | OUTPATIENT
Start: 2019-09-09 | End: 2019-09-09

## 2019-09-09 RX ORDER — HYDROCODONE BITARTRATE AND ACETAMINOPHEN 5; 325 MG/1; MG/1
2 TABLET ORAL AS NEEDED
Status: DISCONTINUED | OUTPATIENT
Start: 2019-09-09 | End: 2019-09-09

## 2019-09-09 RX ORDER — HYDROMORPHONE HYDROCHLORIDE 1 MG/ML
0.4 INJECTION, SOLUTION INTRAMUSCULAR; INTRAVENOUS; SUBCUTANEOUS EVERY 5 MIN PRN
Status: DISCONTINUED | OUTPATIENT
Start: 2019-09-09 | End: 2019-09-09

## 2019-09-09 RX ORDER — CEFAZOLIN SODIUM/WATER 2 G/20 ML
2 SYRINGE (ML) INTRAVENOUS ONCE
Status: COMPLETED | OUTPATIENT
Start: 2019-09-09 | End: 2019-09-09

## 2019-09-09 RX ORDER — BUPIVACAINE HYDROCHLORIDE 5 MG/ML
INJECTION, SOLUTION EPIDURAL; INTRACAUDAL AS NEEDED
Status: DISCONTINUED | OUTPATIENT
Start: 2019-09-09 | End: 2019-09-09 | Stop reason: HOSPADM

## 2019-09-09 RX ORDER — MIDAZOLAM HYDROCHLORIDE 1 MG/ML
1 INJECTION INTRAMUSCULAR; INTRAVENOUS EVERY 5 MIN PRN
Status: DISCONTINUED | OUTPATIENT
Start: 2019-09-09 | End: 2019-09-09

## 2019-09-09 NOTE — IMAGING NOTE
Assisted Dr. Jb Nguyen with Wire Localization of Breast for Lumpectomy. Procedure explained and emotional support provided throughout. Araceli Nathan  tolerated procedure well with minimal discomfort.  Blue clamp placed around base of wire with tegade

## 2019-09-09 NOTE — ANESTHESIA POSTPROCEDURE EVALUATION
[address] Patient Status:  Hospital Outpatient Surgery   Age/Gender 49year old female MRN [id number]   [facility] SURGERY Attending Aliya Cooper, [address] [facility] Se Day # 0 PCP Elisa Henderson MD       Anesthesia Post-op

## 2019-09-09 NOTE — H&P
History of Present Illness:   Ms. Licha Fuchs is a 49year old woman who presents with an imaging detected change to the left breast.  The patient denies any palpable masses, nipple discharge, skin changes or axillary symptoms.   She has no persona 1 tablet (5 mg total) by mouth daily. Disp: 90 tablet Rfl: 1   Ascorbic Acid (C-500/TORRES HIPS OR) Take 1 tablet by mouth daily. Disp:  Rfl:    Multiple Vitamins-Minerals (MULTIVITAMIN ADULT OR) Take 1 tablet by mouth daily.  Disp:  Rfl:    Coenzyme Q10 200 cataracts, redness, glaucoma, yellowing of the eyes, change in vision or color blindness.  The patient denies hearing loss, ringing in the ears, ear drainage, earaches, nasal congestion, nose bleeds, +snoring, pain in mouth/throat, hoarseness, change in voi tingling or burning in hands/feet. There is no history of abusive relationship, bipolar disorder, sleep disturbance, anxiety, depression or feeling of despair.     Endocrine:     There is no history of poor/slow wound healing, weight loss/gain, fertility or masses.     Lymph Nodes: The supraclavicular, axillary and cervical regions are free of significant lymphadenopathy.     Back: There is no vertebral column tenderness.     Skin: The skin appears normal. There are no suspicious appearing rashes or lesions. prior to her next appointment.      Pre-op Diagnosis: Atypical ductal hyperplasia of left breast [N60.92]    The above referenced H&P was reviewed by Antonella Vazquez MD on 9/9/2019, the patient was examined and no significant changes have occurred in the

## 2019-09-09 NOTE — BRIEF OP NOTE
Pre-Operative Diagnosis: Atypical ductal hyperplasia of left breast [N60.92]     Post-Operative Diagnosis: Atypical ductal hyperplasia of left breast [N60.92]      Procedure Performed:   Procedure(s):  Left Breast wire localized excisional biopsy    Jared

## 2019-09-09 NOTE — ANESTHESIA PREPROCEDURE EVALUATION
PRE-OP EVALUATION    Patient Name: Carmen Anaya    Pre-op Diagnosis: Atypical ductal hyperplasia of left breast [N60.92]    Procedure(s):  Left Breast wire localized excisional biopsy    Surgeon(s) and Role:     Mavis Bedolla MD - Primary    P hypertension                                     Endo/Other    Negative endo/other ROS. Pulmonary    Negative pulmonary ROS.                        Neuro/Psych                                    Past Surgical History:   Procedur

## 2019-09-10 PROBLEM — N60.92 ATYPICAL DUCTAL HYPERPLASIA OF LEFT BREAST: Status: ACTIVE | Noted: 2019-09-10

## 2019-09-10 NOTE — OPERATIVE REPORT
[facility]    PATIENT'S NAME: Enid Holman   ATTENDING PHYSICIAN: Babak Montana. Debra East M.D. OPERATING PHYSICIAN: Babak East M.D.    PATIENT ACCOUNT#:   [de-identified]    LOCATION:  PREOPASCC EH PRE ASCC 2 EDWP 10  MEDICAL RECORD #:   [id number] the skin and subcutaneous tissues at the incision site. A curvilinear periareolar incision was made in the superomedial periareolar border with a 15 blade knife in the skin. Her wire was identified and brought into the field.   Using sharp dissection

## 2019-09-12 ENCOUNTER — TELEPHONE (OUTPATIENT)
Dept: SURGERY | Facility: CLINIC | Age: 54
End: 2019-09-12

## 2019-09-12 DIAGNOSIS — D05.10 DUCTAL CARCINOMA IN SITU (DCIS) OF BREAST, UNSPECIFIED LATERALITY: Primary | ICD-10-CM

## 2019-09-12 DIAGNOSIS — Z80.3 FAMILY HISTORY OF BREAST CANCER: ICD-10-CM

## 2019-09-12 NOTE — TELEPHONE ENCOUNTER
Spoke with the patient regarding her upgrade from [facility] to DCIS.   The patient was informed that though her one margin was focally close, there was no tumor on ink and therefore I will not recommend any reexcision and/or further surgical intervention at this

## 2019-09-12 NOTE — TELEPHONE ENCOUNTER
I contacted the patient to see if she prefers [facility] or [place] for her care. Pt lives closer to [facility]. She would like time to process everything, and not have any appointments until after her post op visit on Tuesday with Dr Sully Escalante.    Questions addr

## 2019-09-13 ENCOUNTER — TELEPHONE (OUTPATIENT)
Dept: INTERNAL MEDICINE CLINIC | Facility: CLINIC | Age: 54
End: 2019-09-13

## 2019-09-13 NOTE — TELEPHONE ENCOUNTER
Patient requesting if Dr Nate Busby can return call to her, has discuss further treatment options with the breast specialist, but would like Dr. Martina Mcdonough input also before moving forward with a decision.  Requesting a return call today if possible to further [facility]

## 2019-09-14 NOTE — TELEPHONE ENCOUNTER
Pt is returning a call to Dr. Eliane Leigh.  She states  contacted pt last night 09/13 but was unable to answer

## 2019-09-16 PROBLEM — D05.12 BREAST NEOPLASM, TIS (DCIS), LEFT: Status: ACTIVE | Noted: 2019-09-16

## 2019-09-16 NOTE — PROGRESS NOTES
Breast Surgery Post-Operative Visit    Diagnosis:  DCIS of left breast status post left lumpectomy on September 9, 2019.     Stage:  Cancer Staging  Breast neoplasm, Tis (DCIS), left  Staging form: Breast, AJCC 8th Edition  - Clinical stage from 8/26/2019: Procedure Laterality Date   • BREAST BIOPSY NEEDLE LOCALIZATION Left 9/9/2019    Performed by Ian Wei MD at [facility] MAIN OR   • COLONOSCOPY      age 26 d/t blood in stool, x1 polyp   • COLONOSCOPY      age 29   • COLONOSCOPY      age 44   • [place] body folds Disp:  Rfl:      No current facility-administered medications on file prior to visit. Allergies:       Other                   SWELLING, TONGUE SWELLING,                            Tightness in Throat    Comment:MSG reaction    Family History of present illness    Gastrointestinal:     There is no history of difficulty or pain with swallowing, reflux symptoms, vomiting, dark or bloody stools, constipation, yellowing of the skin, indigestion, nausea, change in bowel habits, diarrhea, abdominal p her breast exam. On exam today I found her to be healing well since recent biopsy with no other clinical findings. I personally reviewed her recent imaging and pathology and we discussed this at length.      She was given ample opportunity for questions an

## 2019-09-17 ENCOUNTER — OFFICE VISIT (OUTPATIENT)
Dept: SURGERY | Facility: CLINIC | Age: 54
End: 2019-09-17
Payer: COMMERCIAL

## 2019-09-17 ENCOUNTER — GENETICS ENCOUNTER (OUTPATIENT)
Dept: GENETICS | Facility: HOSPITAL | Age: 54
End: 2019-09-17
Attending: GENETIC COUNSELOR, MS
Payer: COMMERCIAL

## 2019-09-17 ENCOUNTER — APPOINTMENT (OUTPATIENT)
Dept: HEMATOLOGY/ONCOLOGY | Facility: HOSPITAL | Age: 54
End: 2019-09-17
Attending: GENETIC COUNSELOR, MS
Payer: COMMERCIAL

## 2019-09-17 VITALS
SYSTOLIC BLOOD PRESSURE: 134 MMHG | BODY MASS INDEX: 36.42 KG/M2 | HEIGHT: 68 IN | WEIGHT: 240.31 LBS | HEART RATE: 85 BPM | DIASTOLIC BLOOD PRESSURE: 87 MMHG | OXYGEN SATURATION: 99 %

## 2019-09-17 DIAGNOSIS — D05.10 DUCTAL CARCINOMA IN SITU (DCIS) OF BREAST, UNSPECIFIED LATERALITY: Primary | ICD-10-CM

## 2019-09-17 PROCEDURE — 99024 POSTOP FOLLOW-UP VISIT: CPT | Performed by: SURGERY

## 2019-09-17 PROCEDURE — 36415 COLL VENOUS BLD VENIPUNCTURE: CPT

## 2019-09-17 PROCEDURE — 96040 HC GENETIC COUNSELING EA 30 MIN: CPT | Performed by: GENETIC COUNSELOR, MS

## 2019-09-17 NOTE — PROGRESS NOTES
Referring Provider: Ashley Marley MD    Additional Provider: Lexi Mazariegos MD    Reason for Referral:  Shaan Londono was referred for genetic counseling because of a diagnosis of breast cancer and a family history of breast cancer.   Ms. Lennox Fernandes is sporadic, approximately 5-10% of women with breast cancer have a hereditary cancer syndrome.   Signs of a hereditary cancer syndrome include some rare cancers, common cancers occurring at unusually young ages, multiple primary cancers in the some individual uncertain significance is a DNA change that may or may not alter the function of the gene; therefore, it is usually not possible to determine if the gene variant is responsible for an individual’s increased cancer risk. If Ms.  Eric Mcclain is found to car increased breast cancer screening using mammograms and MRI, ovarian cancer screening, chemoprevention, and prophylactic surgery.   Men with a BRCA1/2 mutation should discuss clinical breast exams, digital rectal exam and prostate specific antigen screening

## 2019-09-17 NOTE — PROGRESS NOTES
Breast Surgery Post-Operative Visit    Diagnosis: Left breast atypical ductal hyperplasia upgraded to DCIS status post lumpectomy on September 9, 2019    Stage: Cancer Staging  Breast neoplasm, Tis (DCIS), left  Staging form: Breast, AJCC 8th Edition  - Cl Tinnitus, right        Past Surgical History:   Procedure Laterality Date   • BREAST BIOPSY NEEDLE LOCALIZATION Left 9/9/2019    Performed by Mak Cummins MD at [facility] MAIN OR   • COLONOSCOPY      age 26 d/t blood in stool, x1 polyp   • COLONOSCOPY Leukemia   • Diabetes Mother         Type II   • Heart Disorder Mother         cardiac murmur   • Breast Cancer Maternal Aunt 36   • Breast Cancer Paternal Aunt 46   • Prostate Cancer Maternal Grandfather    • Cancer Paternal Grandmother 72        Colon CA urine, painful urination, urinary incontinence, decreased urine stream, blood in the urine or vaginal/penile discharge. Skin:    The patient denies rash, itching, skin lesions, dry skin, change in skin color or change in moles.      Hematologic/Lymphatic new diagnosis genetic testing is needed to convey her ultimate optimal plan. She reports that she would be interested in bilateral mastectomies if she is found to be positive for deleterious mutation.   We discussed the option of reexcision of the left yaz

## 2019-09-18 ENCOUNTER — APPOINTMENT (OUTPATIENT)
Dept: HEMATOLOGY/ONCOLOGY | Facility: HOSPITAL | Age: 54
End: 2019-09-18
Attending: GENETIC COUNSELOR, MS
Payer: COMMERCIAL

## 2019-09-30 ENCOUNTER — TELEPHONE (OUTPATIENT)
Dept: SURGERY | Facility: CLINIC | Age: 54
End: 2019-09-30

## 2019-09-30 ENCOUNTER — GENETICS ENCOUNTER (OUTPATIENT)
Dept: HEMATOLOGY/ONCOLOGY | Facility: HOSPITAL | Age: 54
End: 2019-09-30

## 2019-09-30 NOTE — TELEPHONE ENCOUNTER
Pt phoned in to report that since she resumed her exercise she has noticed bruising, and it feeling hard under her breast and up to the incision around her nipple. Denies redness, or fever or chills or swelling.    We talked about a hematoma, and it takes

## 2019-09-30 NOTE — PROGRESS NOTES
Referring Provider:       Davi Grady MD     Additional Provider:     Marilyn Gomez MD    Reason for Referral:  Clare Severino had genetic testing performed on 9/17/19 because of a new diagnosis of DCIS at age 49 and a family history of breast canc her.    In the meantime, Ms. Adrienne Whitmore and her relatives should speak with their physicians to discuss recommended medical management according to their personal and family history.     Cc:  Sharon Rodriguez

## 2019-10-01 ENCOUNTER — TELEPHONE (OUTPATIENT)
Dept: INTERNAL MEDICINE CLINIC | Facility: CLINIC | Age: 54
End: 2019-10-01

## 2019-10-01 NOTE — TELEPHONE ENCOUNTER
Patient called requested to speak with Dr. Mitchell Reddy she stated she need to speak with  About her oncology results.  Need to talk about her next steps    Please call today after 6:30 pm and tomorrow anytime after 1pm

## 2019-10-03 NOTE — TELEPHONE ENCOUNTER
Pt was called back to further clarify message below. States she would like to discuss the results from her recent genetic counseling appointment with Dr Sayra Morales directly. I informed pt that STEFANIE may require a formal appt to discuss all of the details but I would send message to STEFANIE to confirm whether she would be willing to call at the end of clinic today. STEFANIE - please advise   Pt did want to elaborate on specific questions she had with me. States she wants to speak to STEFANIE directly.

## 2019-10-04 ENCOUNTER — TELEPHONE (OUTPATIENT)
Dept: SURGERY | Facility: CLINIC | Age: 54
End: 2019-10-04

## 2019-10-04 NOTE — TELEPHONE ENCOUNTER
LVM for patient to discuss an appt with Dr Aneesh Chase, now that her genetic testing results are back, negative. Asked her to call the office back at her convenience.

## 2019-10-07 ENCOUNTER — TELEPHONE (OUTPATIENT)
Dept: SURGERY | Facility: CLINIC | Age: 54
End: 2019-10-07

## 2019-10-07 NOTE — TELEPHONE ENCOUNTER
Spoke with patient regarding her plan of care. Her genetic testing was negative. Pt wishes to proceed with re excision. She verbalized understanding of needing radiation, and endocrine therapy.    I let her know I would contact the  to arrange

## 2019-10-08 ENCOUNTER — TELEPHONE (OUTPATIENT)
Dept: HEMATOLOGY/ONCOLOGY | Facility: HOSPITAL | Age: 54
End: 2019-10-08

## 2019-10-08 ENCOUNTER — TELEPHONE (OUTPATIENT)
Dept: SURGERY | Facility: CLINIC | Age: 54
End: 2019-10-08

## 2019-10-08 DIAGNOSIS — D05.12 BREAST NEOPLASM, TIS (DCIS), LEFT: ICD-10-CM

## 2019-10-08 DIAGNOSIS — D05.10 DUCTAL CARCINOMA IN SITU (DCIS) OF BREAST, UNSPECIFIED LATERALITY: Primary | ICD-10-CM

## 2019-10-08 NOTE — TELEPHONE ENCOUNTER
Confirmed with patient OR date of 10/29. Post op visit scheduled as well. Reviewed surgical instructions with patient, as she did for prior procedure. Multiple questions regarding radiation, tamoxifen, and her care addressed.    Offered her meet with cecilia

## 2019-10-08 NOTE — TELEPHONE ENCOUNTER
LVM for patient to discuss appointments with medical and radiation oncology to ensure her questions are answered. Contact information provided.

## 2019-10-11 ENCOUNTER — TELEPHONE (OUTPATIENT)
Dept: SURGERY | Facility: CLINIC | Age: 54
End: 2019-10-11

## 2019-10-11 NOTE — TELEPHONE ENCOUNTER
Received a call from nurse  from her insurance requesting records. Received release of records consent. Requested records faxed.

## 2019-10-14 ENCOUNTER — TELEPHONE (OUTPATIENT)
Dept: SURGERY | Facility: CLINIC | Age: 54
End: 2019-10-14

## 2019-10-14 DIAGNOSIS — D05.12 BREAST NEOPLASM, TIS (DCIS), LEFT: Primary | ICD-10-CM

## 2019-10-14 NOTE — TELEPHONE ENCOUNTER
Returned patients call regarding cancelling her upcoming procedure, left breast re excision. Pt does not want to reschedule at this time. States that she needs time to process everything, and is not ready to schedule at this time.    She also wants to [employer]

## 2019-10-18 ENCOUNTER — TELEPHONE (OUTPATIENT)
Dept: SURGERY | Facility: CLINIC | Age: 54
End: 2019-10-18

## 2019-10-18 NOTE — TELEPHONE ENCOUNTER
Pt LVM letting me know she wishes to schedule her procedure with dr Waleska Mccarty, for re excision of margin.    Will let  know

## 2019-10-21 ENCOUNTER — TELEPHONE (OUTPATIENT)
Dept: SURGERY | Facility: CLINIC | Age: 54
End: 2019-10-21

## 2019-10-21 DIAGNOSIS — D05.12 BREAST NEOPLASM, TIS (DCIS), LEFT: Primary | ICD-10-CM

## 2019-10-21 NOTE — TELEPHONE ENCOUNTER
Pt phoned in ready to schedule the re excision. Date of 10/29 still available, and will schedule for that date. Questions addressed. Pt encouraged to call sooner with any questions.

## 2019-10-22 RX ORDER — IODINE SOLUTION STRONG 5% (LUGOL'S) 5 %
4 SOLUTION ORAL DAILY
COMMUNITY
End: 2021-09-07 | Stop reason: ALTCHOICE

## 2019-10-29 ENCOUNTER — ANESTHESIA (OUTPATIENT)
Dept: SURGERY | Facility: HOSPITAL | Age: 54
End: 2019-10-29
Payer: COMMERCIAL

## 2019-10-29 ENCOUNTER — ANESTHESIA EVENT (OUTPATIENT)
Dept: SURGERY | Facility: HOSPITAL | Age: 54
End: 2019-10-29
Payer: COMMERCIAL

## 2019-10-29 ENCOUNTER — HOSPITAL ENCOUNTER (OUTPATIENT)
Facility: HOSPITAL | Age: 54
Setting detail: HOSPITAL OUTPATIENT SURGERY
Discharge: HOME | End: 2019-10-29
Attending: SURGERY | Admitting: SURGERY
Payer: COMMERCIAL

## 2019-10-29 VITALS
RESPIRATION RATE: 16 BRPM | BODY MASS INDEX: 35.61 KG/M2 | OXYGEN SATURATION: 98 % | SYSTOLIC BLOOD PRESSURE: 113 MMHG | DIASTOLIC BLOOD PRESSURE: 69 MMHG | HEIGHT: 68 IN | TEMPERATURE: 98 F | WEIGHT: 235 LBS | HEART RATE: 57 BPM

## 2019-10-29 DIAGNOSIS — D05.12 BREAST NEOPLASM, TIS (DCIS), LEFT: ICD-10-CM

## 2019-10-29 PROCEDURE — 0HBU0ZZ EXCISION OF LEFT BREAST, OPEN APPROACH: ICD-10-PCS | Performed by: SURGERY

## 2019-10-29 PROCEDURE — 81025 URINE PREGNANCY TEST: CPT | Performed by: SURGERY

## 2019-10-29 PROCEDURE — 88305 TISSUE EXAM BY PATHOLOGIST: CPT | Performed by: SURGERY

## 2019-10-29 RX ORDER — HYDROCODONE BITARTRATE AND ACETAMINOPHEN 5; 325 MG/1; MG/1
1 TABLET ORAL AS NEEDED
Status: DISCONTINUED | OUTPATIENT
Start: 2019-10-29 | End: 2019-10-29

## 2019-10-29 RX ORDER — HYDROCODONE BITARTRATE AND ACETAMINOPHEN 5; 325 MG/1; MG/1
2 TABLET ORAL AS NEEDED
Status: DISCONTINUED | OUTPATIENT
Start: 2019-10-29 | End: 2019-10-29

## 2019-10-29 RX ORDER — ACETAMINOPHEN 500 MG
1000 TABLET ORAL EVERY 6 HOURS PRN
COMMUNITY
End: 2019-11-14 | Stop reason: ALTCHOICE

## 2019-10-29 RX ORDER — BUPIVACAINE HYDROCHLORIDE 5 MG/ML
INJECTION, SOLUTION EPIDURAL; INTRACAUDAL AS NEEDED
Status: DISCONTINUED | OUTPATIENT
Start: 2019-10-29 | End: 2019-10-29 | Stop reason: HOSPADM

## 2019-10-29 RX ORDER — CEFAZOLIN SODIUM/WATER 2 G/20 ML
2 SYRINGE (ML) INTRAVENOUS ONCE
Status: COMPLETED | OUTPATIENT
Start: 2019-10-29 | End: 2019-10-29

## 2019-10-29 RX ORDER — SODIUM CHLORIDE, SODIUM LACTATE, POTASSIUM CHLORIDE, CALCIUM CHLORIDE 600; 310; 30; 20 MG/100ML; MG/100ML; MG/100ML; MG/100ML
INJECTION, SOLUTION INTRAVENOUS CONTINUOUS
Status: DISCONTINUED | OUTPATIENT
Start: 2019-10-29 | End: 2019-10-29

## 2019-10-29 RX ORDER — METOCLOPRAMIDE HYDROCHLORIDE 5 MG/ML
10 INJECTION INTRAMUSCULAR; INTRAVENOUS AS NEEDED
Status: DISCONTINUED | OUTPATIENT
Start: 2019-10-29 | End: 2019-10-29

## 2019-10-29 RX ORDER — NALOXONE HYDROCHLORIDE 0.4 MG/ML
80 INJECTION, SOLUTION INTRAMUSCULAR; INTRAVENOUS; SUBCUTANEOUS AS NEEDED
Status: DISCONTINUED | OUTPATIENT
Start: 2019-10-29 | End: 2019-10-29

## 2019-10-29 RX ORDER — ACETAMINOPHEN 500 MG
1000 TABLET ORAL ONCE
Status: DISCONTINUED | OUTPATIENT
Start: 2019-10-29 | End: 2019-10-29 | Stop reason: HOSPADM

## 2019-10-29 RX ORDER — ONDANSETRON 2 MG/ML
4 INJECTION INTRAMUSCULAR; INTRAVENOUS AS NEEDED
Status: DISCONTINUED | OUTPATIENT
Start: 2019-10-29 | End: 2019-10-29

## 2019-10-29 RX ORDER — LIDOCAINE HYDROCHLORIDE AND EPINEPHRINE 10; 10 MG/ML; UG/ML
INJECTION, SOLUTION INFILTRATION; PERINEURAL AS NEEDED
Status: DISCONTINUED | OUTPATIENT
Start: 2019-10-29 | End: 2019-10-29 | Stop reason: HOSPADM

## 2019-10-29 RX ORDER — HYDROCODONE BITARTRATE AND ACETAMINOPHEN 5; 325 MG/1; MG/1
1-2 TABLET ORAL EVERY 6 HOURS PRN
Qty: 20 TABLET | Refills: 0 | Status: SHIPPED | OUTPATIENT
Start: 2019-10-29 | End: 2019-11-14 | Stop reason: ALTCHOICE

## 2019-10-29 NOTE — H&P
Diagnosis: Left breast atypical ductal hyperplasia upgraded to DCIS status post lumpectomy on September 9, 2019     Stage: Cancer Staging  Breast neoplasm, Tis (DCIS), left  Staging form: Breast, AJCC 8th Edition  - Clinical stage from 8/26/2019: cT0, cN0, Past Surgical History:   Procedure Laterality Date   • BREAST BIOPSY NEEDLE LOCALIZATION Left 9/9/2019     Performed by Vy Gregg MD at [facility] MAIN OR   • COLONOSCOPY         age 26 d/t blood in stool, x1 polyp   • COLONOSCOPY         age 29   • C           Leukemia   • Diabetes Mother           Type II   • Heart Disorder Mother           cardiac murmur   • Breast Cancer Maternal Aunt 36   • Breast Cancer Paternal Aunt 46   • Prostate Cancer Maternal Grandfather     • Cancer Paternal Grandmother 72 urinate, urinary hesitancy or retaining urine, painful urination, urinary incontinence, decreased urine stream, blood in the urine or vaginal/penile discharge.     Skin:    The patient denies rash, itching, skin lesions, dry skin, change in skin color or c discussed that in the context of her family history new diagnosis genetic testing is needed to convey her ultimate optimal plan. She reports that she would be interested in bilateral mastectomies if she is found to be positive for deleterious mutation.   Mame Rene

## 2019-10-29 NOTE — ANESTHESIA POSTPROCEDURE EVALUATION
[address]  [address] Patient Status:  Hospital Outpatient Surgery   Age/Gender 49year old female MRN [id number]   [facility] SURGERY Attending Ian Wei, [address] [place] [address] Se Day # 0 PCP Jonny Eldridge MD       Anesthesia

## 2019-10-29 NOTE — ANESTHESIA PREPROCEDURE EVALUATION
PRE-OP EVALUATION    Patient Name: Halina Lundborg    Pre-op Diagnosis: Breast neoplasm, Tis (DCIS), left [D05.12]    Procedure(s):  left breast re-excision inferior margin    Surgeon(s) and Role:     Aubree Smith MD - Primary    Pre-op laterality     Thyroid nodule     Granuloma annulare     Essential hypertension     Obesity (BMI 35.0-39.9 without comorbidity)     Abnormal mammogram     Atypical ductal hyperplasia of left breast     Breast neoplasm, Tis (DCIS), left          Past Surgic

## 2019-10-29 NOTE — BRIEF OP NOTE
Pre-Operative Diagnosis: Breast neoplasm, Tis (DCIS), left [D05.12]     Post-Operative Diagnosis: Breast neoplasm, Tis (DCIS), left [D05.12]      Procedure Performed:   Procedure(s):  left breast re-excision inferior margin    Surgeon(s) and Role:     * Gr

## 2019-11-01 NOTE — OPERATIVE REPORT
[facility]    PATIENT'S NAME: Angelika Lezama   ATTENDING PHYSICIAN: Britt Soler. Yadira Chapin M.D. OPERATING PHYSICIAN: Britt Soler. Yadira Chapin M.D.    PATIENT ACCOUNT#:   [de-identified]    LOCATION:  PREOPASCC  PRE ASCC 12 EDWP 10  MEDICAL RECORD Hemostasis was achieved with electrocautery. Her post lumpectomy seroma cavity was identified and brought into the field. Margins of the area were identified. The inferior area was identified and a wide margin was taken with electrocautery.   A clip was

## 2019-11-05 ENCOUNTER — OFFICE VISIT (OUTPATIENT)
Dept: SURGERY | Facility: CLINIC | Age: 54
End: 2019-11-05
Payer: COMMERCIAL

## 2019-11-05 ENCOUNTER — NURSE NAVIGATOR ENCOUNTER (OUTPATIENT)
Dept: HEMATOLOGY/ONCOLOGY | Facility: HOSPITAL | Age: 54
End: 2019-11-05

## 2019-11-05 VITALS
RESPIRATION RATE: 20 BRPM | TEMPERATURE: 98 F | DIASTOLIC BLOOD PRESSURE: 84 MMHG | HEART RATE: 91 BPM | SYSTOLIC BLOOD PRESSURE: 133 MMHG

## 2019-11-05 DIAGNOSIS — D05.12 NEOPLASM OF LEFT BREAST, PRIMARY TUMOR STAGING CATEGORY TIS: DUCTAL CARCINOMA IN SITU (DCIS): Primary | ICD-10-CM

## 2019-11-05 PROCEDURE — 99024 POSTOP FOLLOW-UP VISIT: CPT | Performed by: SURGERY

## 2019-11-05 NOTE — PROGRESS NOTES
Met with patient following appointment with Dr. Olga Lidia Onofre. Pt is ready to meet with medical and radiation oncology. Assisted in scheduling on 11/25 with Dr. Effie Salomon, as she prefers the [facility].  Offered sooner appointment in [place], which atif

## 2019-11-06 ENCOUNTER — TELEPHONE (OUTPATIENT)
Dept: INTERNAL MEDICINE CLINIC | Facility: CLINIC | Age: 54
End: 2019-11-06

## 2019-11-06 ENCOUNTER — HOSPITAL ENCOUNTER (OUTPATIENT)
Dept: ULTRASOUND IMAGING | Age: 54
Discharge: HOME | End: 2019-11-06
Attending: INTERNAL MEDICINE
Payer: COMMERCIAL

## 2019-11-06 DIAGNOSIS — E04.1 THYROID NODULE: ICD-10-CM

## 2019-11-06 PROCEDURE — 76536 US EXAM OF HEAD AND NECK: CPT | Performed by: INTERNAL MEDICINE

## 2019-11-06 NOTE — TELEPHONE ENCOUNTER
Patient called in stating that she did not recall who the thyroid specialist was that Dr. Adiel Mccann would have recommended during her last office visit. She is hoping to schedule an appointment for a normal follow-up, no concerns.      She is completing al

## 2019-11-06 NOTE — PROGRESS NOTES
Breast Surgery Post-Operative Visit    Diagnosis: Left breast atypical ductal hyperplasia upgraded to DCIS status post lumpectomy on September 9, 2019 no status post reexcision of inferior margin on October 29, 2019.     Stage: Cancer Staging  Breast neopla • Essential hypertension    • Granuloma annulare    • Hemorrhoids    • High blood pressure    • Obesity    • Pap smear for cervical cancer screening 02/2015 02/2014    NEGATIVE/HPV NEGATIVE   • Tinnitus, right    • Visual impairment     GLASSES       Pas Colon CA   • Cancer Paternal Grandfather         Lung CA - Smoker       She is not of Ashkenazi Anabaptism ancestry. Social History:    Alcohol use No         Smoking status: Never Smoker   Smokeless tobacco: Never Used   The patient is .   She has 2 Hematologic/Lymphatic:  The patient denies easily bruising or bleeding or persistent swollen glands or lymph nodes. Musculoskeletal:  The patient denies muscle aches/pain, joint pain, stiff joints, neck pain, back pain or bone pain.     Neuropsychia may be warranted in the future if she identifies any limitations or restrictions. She was encouraged to contact the office with any questions or concerns prior to her next scheduled appointment.

## 2019-11-07 NOTE — TELEPHONE ENCOUNTER
Informed patient of Dr. Carol Main, but patient requesting I send a [employer] message due to her driving.

## 2019-11-08 ENCOUNTER — APPOINTMENT (OUTPATIENT)
Dept: LAB | Age: 54
End: 2019-11-08
Attending: INTERNAL MEDICINE
Payer: COMMERCIAL

## 2019-11-08 DIAGNOSIS — E88.81 INSULIN RESISTANCE: ICD-10-CM

## 2019-11-08 DIAGNOSIS — Z01.818 PRE-OP TESTING: ICD-10-CM

## 2019-11-08 DIAGNOSIS — E66.9 OBESITY (BMI 35.0-39.9 WITHOUT COMORBIDITY): ICD-10-CM

## 2019-11-08 PROCEDURE — 83036 HEMOGLOBIN GLYCOSYLATED A1C: CPT

## 2019-11-08 PROCEDURE — 83525 ASSAY OF INSULIN: CPT

## 2019-11-08 PROCEDURE — 80053 COMPREHEN METABOLIC PANEL: CPT

## 2019-11-11 NOTE — PROGRESS NOTES
Nursing Consultation Note  Patient: Hillary Velazco  YOB: 1965  Age: 49year old  Radiation Oncologist: Dr. Roe Thompson  Referring Physician: Rolena Schwab, Dr. Juan Carlos Gomez, Dr. Oral Del Rio (PCP)  Diagnosis: LEFT BREAST CANCER  Consult D Negative. Musculoskeletal: Negative. Skin: Negative. Allergic/Immunologic: Negative. Neurological: Negative. Hematological: Negative. Psychiatric/Behavioral: Negative. Allergies:     Other                   SWELLING, TONGUE SWELL hypertension    • Granuloma annulare    • Hemorrhoids    • High blood pressure    • Obesity    • Pap smear for cervical cancer screening 02/2015 02/2014    NEGATIVE/HPV NEGATIVE   • Tinnitus, right    • Visual impairment     GLASSES       Past Surgical His on file        Attends meetings of clubs or organizations: Not on file        Relationship status: Not on file      Intimate partner violence:        Fear of current or ex partner: Not on file        Emotionally abused: Not on file        Physically abused

## 2019-11-12 ENCOUNTER — HOSPITAL ENCOUNTER (OUTPATIENT)
Dept: RADIATION ONCOLOGY | Age: 54
Discharge: HOME | End: 2019-11-12
Attending: RADIOLOGY
Payer: COMMERCIAL

## 2019-11-12 VITALS
HEART RATE: 66 BPM | BODY MASS INDEX: 36.43 KG/M2 | HEIGHT: 68 IN | TEMPERATURE: 98 F | DIASTOLIC BLOOD PRESSURE: 95 MMHG | RESPIRATION RATE: 16 BRPM | OXYGEN SATURATION: 96 % | SYSTOLIC BLOOD PRESSURE: 146 MMHG | WEIGHT: 240.38 LBS

## 2019-11-12 DIAGNOSIS — D05.12 BREAST NEOPLASM, TIS (DCIS), LEFT: ICD-10-CM

## 2019-11-12 PROCEDURE — 99214 OFFICE O/P EST MOD 30 MIN: CPT

## 2019-11-12 NOTE — CONSULTS
RADIATION ONCOLOGY NOTE    DATE OF VISIT: 11/12/2019    DIAGNOSIS: Stage 0 pTis N0,M0, Grade 3, DCIS of the left breast, ER+, AL+, s/p lumpectomy, recovering well, for adjuvant XRT and hormonal blockade,    Dear Norberto Pryor and colleagues,     As yo Negative. Current Outpatient Medications   Medication Sig Dispense Refill   • Magnesium 100 MG Oral Tab Take by mouth. • Krill Oil 300 MG Oral Cap Take 1 tablet by mouth daily. • iodine strong 5 % Oral Solution Take 4 drops by mouth daily. and vomiting), or Pseudocholinesterase deficiency.     PAST SURGICAL HISTORY:   has a past surgical history that includes colonoscopy (age 26 d/t blood in stool, x1 polyp); colonoscopy (age 29); colonoscopy (age 44); lumpectomy left (Left, 09/09/2019) (Left pathologic and laboratory studies.     ASSESSMENT/PLAN    50 yo with Stage 0 pTis N0,M0, Grade 3, DCIS of the left breast, ER+, VA+, s/p lumpectomy, recovering well, for adjuvant XRT and hormonal blockade,      I have explained the diagnosis, stage, natural microscopic analysis. Because of formalin fixation and paraffin embedding of the tissue, the actual levels of estrogen and progesterone receptor may be higher than is detected in this case.      Methodology:         Immunohistochemical stains are performed Examined  28    Architectural Patterns  Comedo      Micropapillary      Solid    Nuclear Grade  Grade III (high)    Accessory Findings     Necrosis  Present, central (expansive \"comedo\" necrosis)    Microcalcifications  Present in DCIS    MARGINS   [place]

## 2019-11-14 ENCOUNTER — OFFICE VISIT (OUTPATIENT)
Dept: INTERNAL MEDICINE CLINIC | Facility: CLINIC | Age: 54
End: 2019-11-14
Payer: COMMERCIAL

## 2019-11-14 VITALS
RESPIRATION RATE: 20 BRPM | BODY MASS INDEX: 35.97 KG/M2 | HEIGHT: 68 IN | DIASTOLIC BLOOD PRESSURE: 70 MMHG | TEMPERATURE: 98 F | HEART RATE: 76 BPM | WEIGHT: 237.31 LBS | SYSTOLIC BLOOD PRESSURE: 130 MMHG

## 2019-11-14 DIAGNOSIS — D05.12 BREAST NEOPLASM, TIS (DCIS), LEFT: Primary | ICD-10-CM

## 2019-11-14 DIAGNOSIS — E04.1 THYROID NODULE: ICD-10-CM

## 2019-11-14 DIAGNOSIS — R22.1 MASS OF LATERAL NECK: ICD-10-CM

## 2019-11-14 DIAGNOSIS — I10 ESSENTIAL HYPERTENSION: ICD-10-CM

## 2019-11-14 DIAGNOSIS — E66.9 OBESITY (BMI 35.0-39.9 WITHOUT COMORBIDITY): ICD-10-CM

## 2019-11-14 PROCEDURE — 99214 OFFICE O/P EST MOD 30 MIN: CPT | Performed by: INTERNAL MEDICINE

## 2019-11-14 PROCEDURE — 99212 OFFICE O/P EST SF 10 MIN: CPT | Performed by: INTERNAL MEDICINE

## 2019-11-14 NOTE — PROGRESS NOTES
HPI:    Patient ID: Arelis Coronel is a 49year old female.     Ultrasound of the thyroid discussed–  FINDINGS:       MEASUREMENTS:  RIGHT LOBE:  5.3 x 1.8 x 2.0 cm  LEFT LOBE:  5.2 x 2.4 x 2.3 cm  ISTHMUS:  0.4 cm     NODULES:  Cyst within the tumor.     Electronically signed by Caro Smith MD on 10/30/2019 at  2:56 PM    Patient remains skeptical about for the treatment–aromatase inhibitors as well as radiation therapy.   She met with a naturopathic doctor and is planning herbal supplements in Outpatient Medications   Medication Sig Dispense Refill   • Magnesium 100 MG Oral Tab Take by mouth. • Krill Oil 300 MG Oral Cap Take 1 tablet by mouth daily. • iodine strong 5 % Oral Solution Take 4 drops by mouth daily.      • metFORMIN HCl  no wheezes. She has no rales. She exhibits no tenderness. Abdominal: Soft. Bowel sounds are normal. She exhibits no distension and no mass. There is no tenderness. There is no rebound. Musculoskeletal: Normal range of motion.          General: No tenderne She has tolerated the medications well. With regards to the labs done recently–this was nonfasting and hence cannot be a true reflection of the insulin levels. Will recheck labs in 3 months.          Relevant Orders    COMP METABOLIC PANEL (14)    INSULI NBN, NTHL1, PALB2, PMS2, POLD1 (select regions), POLE (select regions), PTEN, RAD51C, RAD51D, RNF43, RPS20, SMAD4, STK11, and TP53. Two variants of uncertain significance, ALESHA c.6572+4T>C and NBN c.1880A>G (p.Meq473Tbm), were identified.   Please refer to supplements that she would like to start on instead that rather than aromatase inhibitors according to patient's research.   As I do not know much about this would have patient should discuss this with medical oncology and then start on the treatment plan a

## 2019-11-14 NOTE — PATIENT INSTRUCTIONS
Problem List Items Addressed This Visit        Unprioritized    Breast neoplasm, Tis (DCIS), left - Primary     Recently diagnosed with left breast atypical ductal hyperplasia status post excision, and was found to have ductal carcinoma in situ.    She had c. 1880A>G (p.Nag881Ius), were identified. Please refer to the report from [facility] for additional testing information. These results were discussed with Ms. Kyle Campo by phone on 9/30/19.       Summary and Plan:  The etiology of Ms. Nathan’s ca oncology and then start on the treatment plan as directed. Essential hypertension     Blood pressure 130/70, pulse 76, temperature 98.2 °F (36.8 °C), temperature source Oral, resp.  rate 20, height 5' 8\" (1.727 m), weight 237 lb 4.8 oz (107.6 kg), advised to follow-up with Dr. Héctor Cabezas for an evaluation. Call [phone] for an appointment.

## 2019-11-14 NOTE — ASSESSMENT & PLAN NOTE
Blood pressure 130/70, pulse 76, temperature 98.2 °F (36.8 °C), temperature source Oral, resp. rate 20, height 5' 8\" (1.727 m), weight 237 lb 4.8 oz (107.6 kg), last menstrual period 10/01/2019, not currently breastfeeding.     Blood pressure looks stable

## 2019-11-14 NOTE — ASSESSMENT & PLAN NOTE
Wt Readings from Last 6 Encounters:  11/14/19 : 237 lb 4.8 oz (107.6 kg)  11/12/19 : 240 lb 6.4 oz (109 kg)  10/22/19 : 235 lb (106.6 kg)  09/17/19 : 240 lb 4.8 oz (109 kg)  09/09/19 : 240 lb 4.8 oz (109 kg)  09/03/19 : 242 lb (109.8 kg)     Patient has lo

## 2019-11-14 NOTE — ASSESSMENT & PLAN NOTE
Ultrasound of the thyroid shows a pretty large nodule on the left thyroid. Biopsy has been recommended. However the patient sees Dr. Aric Valente and has had a biopsy of this lesion in 2016. Old records have been requested.   Patient is advised to follow-up w

## 2019-11-14 NOTE — ASSESSMENT & PLAN NOTE
Recently diagnosed with left breast atypical ductal hyperplasia status post excision, and was found to have ductal carcinoma in situ.    She had her lumpectomy on September 9, 2019 and now status post reexcision of inferior margin on October 29, 2019 and ov testing information. These results were discussed with Ms. Elida Kate by phone on 9/30/19.       Summary and Plan:  The etiology of Ms. Nathan’s cancer remains unexplained. The limitations of the testing were discussed with Ms. Nathan including the naveen

## 2019-11-25 ENCOUNTER — OFFICE VISIT (OUTPATIENT)
Dept: HEMATOLOGY/ONCOLOGY | Age: 54
End: 2019-11-25
Attending: INTERNAL MEDICINE
Payer: COMMERCIAL

## 2019-11-25 VITALS
RESPIRATION RATE: 20 BRPM | OXYGEN SATURATION: 97 % | SYSTOLIC BLOOD PRESSURE: 148 MMHG | TEMPERATURE: 98 F | HEART RATE: 109 BPM | BODY MASS INDEX: 37 KG/M2 | WEIGHT: 243.81 LBS | DIASTOLIC BLOOD PRESSURE: 93 MMHG

## 2019-11-25 DIAGNOSIS — D05.12 BREAST NEOPLASM, TIS (DCIS), LEFT: Primary | ICD-10-CM

## 2019-11-25 PROCEDURE — 99244 OFF/OP CNSLTJ NEW/EST MOD 40: CPT | Performed by: INTERNAL MEDICINE

## 2019-11-25 NOTE — PROGRESS NOTES
Pt here for MD consultation for BRCA. Had left breast lumpectomy with Dr. Keira Man on 9/9/19, re-excision inferior margin bx on 10/29/19. Pt feels well, has occasional tenderness of left breast. States incision sites healing well. Full ROM to SUKUMAR.  Met with cecilia

## 2019-11-25 NOTE — CONSULTS
[facility] Report of Consultation    Patient Name: Michelle Fontana   YOB: 1965   Medical Record Number: [id number]   CSN: [id number]   Consulting Physician: Leif Saldana MD  Referring Physician(s): Ashley Marley MD  Date o LEFT Left 09/09/2019    Left wire loc lumpectomy Wendelyn Osler)       Family Medical History:  Family History   Problem Relation Age of Onset   • Heart Disorder Father         palpitations   • Cancer Father         Leukemia and prostate cancer   • Prostate Cance Cholecalciferol (VITAMIN D-3 OR), Take 1 tablet by mouth daily. 1000 UNITS , Disp: , Rfl:   •  Probiotic Product (PROBIOTIC DAILY OR), Take 1 capsule by mouth daily. , Disp: , Rfl:   •  Turmeric Does not apply Powder, by Does not apply route daily. , Disp: , abnormal mammogram.  Biopsy showed ADH. Initially underwent excision biopsy followed by lumpectomy on 10/29/2019. Final pathology-9 mm grade 3 DCIS, 20% ER, 15% NM. Final margins negative.     I discussed the natural history, course, prognosis and treatm

## 2019-12-03 ENCOUNTER — TELEPHONE (OUTPATIENT)
Dept: INTERNAL MEDICINE CLINIC | Facility: CLINIC | Age: 54
End: 2019-12-03

## 2019-12-03 NOTE — TELEPHONE ENCOUNTER
Dr Adriano Medel, please advise. OV 11/14/19. Patient would like to ask you, who should she see--Dr Héctor Cabezas ( on 12/12/19 at 10:10 am for thyroid nodule) or Dr Robin Valenzuela (on 1/6/20 at 2 pm for thyroid nodule)?  She has appointment with both, not sure if she shou

## 2019-12-04 NOTE — TELEPHONE ENCOUNTER
Dr Jerome Cherry first and then dr Bennie Palmer if surgery is advised.     Dr Jerome Cherry is the medical doctor and dr Bennie Palmer is surgical md

## 2019-12-05 ENCOUNTER — NURSE ONLY (OUTPATIENT)
Dept: HEMATOLOGY/ONCOLOGY | Age: 54
End: 2019-12-05
Attending: INTERNAL MEDICINE
Payer: COMMERCIAL

## 2019-12-06 ENCOUNTER — NURSE TRIAGE (OUTPATIENT)
Dept: INTERNAL MEDICINE CLINIC | Facility: CLINIC | Age: 54
End: 2019-12-06

## 2019-12-06 VITALS — BODY MASS INDEX: 37 KG/M2 | WEIGHT: 240.19 LBS

## 2019-12-06 DIAGNOSIS — R39.9 UTI SYMPTOMS: Primary | ICD-10-CM

## 2019-12-06 RX ORDER — CIPROFLOXACIN 500 MG/1
TABLET, FILM COATED ORAL
Qty: 10 TABLET | Refills: 0 | Status: SHIPPED | OUTPATIENT
Start: 2019-12-06 | End: 2020-02-06 | Stop reason: ALTCHOICE

## 2019-12-06 NOTE — TELEPHONE ENCOUNTER
Cipro 500 mg p.o. twice daily for 5 days. Would advised  a UA and a culture and sensitivity if symptoms recur after this treatment.   Check the urine test about 3 to 5 days after completion of antibiotics if needed  Please place the order for that so she i

## 2019-12-06 NOTE — TELEPHONE ENCOUNTER
Patient is reporting symptoms of a UTI, wants to know if an antibiotic can be prescribed. Transferring to triage.

## 2019-12-06 NOTE — TELEPHONE ENCOUNTER
Action Requested: Summary for Provider     []  Critical Lab, Recommendations Needed  [x] Need Additional Advice  []   FYI    []   Need Orders  [] Need Medications Sent to Pharmacy  []  Other     SUMMARY: Patient requesting antibiotic for concerns of UTI, r

## 2019-12-06 NOTE — PROGRESS NOTES
Nutrition Consultation    Patient Name: Esthela Mcfarland  YOB: 1965  Medical Record Number: [id number]   Account Number: [de-identified]  Dietitian: Pallavi Jarvis RD, LDN      Date of visit: 12/5/2019    Diet Rx: high quality nutriti Rfl:   •  Turmeric Does not apply Powder, by Does not apply route daily. , Disp: , Rfl:     Pertinent Labs:     Height: 5'8\"            IBW: 140 +/- 10%    WT HX:   11/05/19: 240 lbs 3.2 oz    Estimated Nutrition Needs: 18-22 kcals/kg PBW for wt loss= 1750

## 2019-12-06 NOTE — TELEPHONE ENCOUNTER
Patient was called and informed of Dr. Xiao Boyle note and plan of care. Patient verbalized understanding and agreed to plan of care.     Cipro 500 mg p.o. twice daily for 5 days ordered    UA with culture and sensitivity ordered      Ciprofloxacin HCl (CIPRO

## 2019-12-09 RX ORDER — METFORMIN HYDROCHLORIDE 750 MG/1
TABLET, EXTENDED RELEASE ORAL
Qty: 90 TABLET | Refills: 1 | Status: SHIPPED | OUTPATIENT
Start: 2019-12-09 | End: 2020-07-06

## 2019-12-19 ENCOUNTER — TELEPHONE (OUTPATIENT)
Dept: SURGERY | Facility: CLINIC | Age: 54
End: 2019-12-19

## 2019-12-19 NOTE — TELEPHONE ENCOUNTER
Faxed requested path and office note to Joan Aguilar RN case manager.     [phone]  Fax [phone]

## 2019-12-23 ENCOUNTER — APPOINTMENT (OUTPATIENT)
Dept: LAB | Age: 54
End: 2019-12-23
Attending: INTERNAL MEDICINE
Payer: COMMERCIAL

## 2019-12-23 DIAGNOSIS — R39.9 UTI SYMPTOMS: ICD-10-CM

## 2019-12-23 PROCEDURE — 81001 URINALYSIS AUTO W/SCOPE: CPT

## 2019-12-27 ENCOUNTER — TELEPHONE (OUTPATIENT)
Dept: OTHER | Age: 54
End: 2019-12-27

## 2019-12-27 NOTE — TELEPHONE ENCOUNTER
Pt calling for urine test results, states she had testing done after she completed course of antibiotics. Noted results from 12/23/19 urine test in EMR.

## 2019-12-28 NOTE — TELEPHONE ENCOUNTER
Patient informed (Name and  verified) of test results/recommendations from Dr Juan Landers. Patient voiced understanding and agrees with plan of care.

## 2019-12-28 NOTE — TELEPHONE ENCOUNTER
Urine analysis was normal.  Culture was ordered as a reflex if the urine was abnormal.  Hence the urine culture was not performed.

## 2020-01-06 ENCOUNTER — OFFICE VISIT (OUTPATIENT)
Dept: ENDOCRINOLOGY CLINIC | Facility: CLINIC | Age: 55
End: 2020-01-06
Payer: COMMERCIAL

## 2020-01-06 VITALS
SYSTOLIC BLOOD PRESSURE: 142 MMHG | DIASTOLIC BLOOD PRESSURE: 96 MMHG | HEART RATE: 76 BPM | BODY MASS INDEX: 37 KG/M2 | WEIGHT: 243.38 LBS

## 2020-01-06 DIAGNOSIS — E04.1 THYROID NODULE: Primary | ICD-10-CM

## 2020-01-06 PROCEDURE — 99243 OFF/OP CNSLTJ NEW/EST LOW 30: CPT | Performed by: INTERNAL MEDICINE

## 2020-01-06 NOTE — H&P
New Patient Evaluation - History and Physical    CONSULT - Reason for Visit:  Thyroid nodule  Requesting Physician: Dr Lo Longoria:  Patient presents with:  Consult: Thyroid nodule.  Previously seen Dr. Yonatan Scott in [facility] sign Oral Solution Take 4 drops by mouth daily. • amLODIPine Besylate 5 MG Oral Tab Take 1 tablet (5 mg total) by mouth daily. 90 tablet 1   • Ascorbic Acid (C-500/TORRES HIPS OR) Take 1 tablet by mouth daily.      • Multiple Vitamins-Minerals (MULTIVITAMIN AD Diabetes Mother         Type II   • Heart Disorder Mother         cardiac murmur   • Breast Cancer Maternal Aunt 36   • Breast Cancer Paternal Aunt 46   • Prostate Cancer Maternal Grandfather    • Cancer Paternal Grandmother 72        Colon CA   • Cancer P no rashes and no lesions  EXTREMITIES: no edema      DATA:     Pertinent data reviewed      ASSESSMENT AND PLAN:    Patient is a 49year old female with Euthyroid MNG  -Discussed common occurrence of thyroid nodules in the population approx 50-60% of the p

## 2020-01-14 ENCOUNTER — TELEPHONE (OUTPATIENT)
Dept: ENDOCRINOLOGY CLINIC | Facility: CLINIC | Age: 55
End: 2020-01-14

## 2020-01-14 NOTE — TELEPHONE ENCOUNTER
Spoke with pt and notified with understanding. She opts to wait and repeat US in 6-8 months. Denies further questions at this time. JORGE DELACRUZ.

## 2020-01-14 NOTE — TELEPHONE ENCOUNTER
Patient has a thyroid nodules. Reviewed FNA results of left thyroid nodule from 2015. Left thyroid nodule FNA was reported as non diagnostic ( patient had told me benign) which means they could not tell for sure whether the nodule was benign or not.

## 2020-01-16 ENCOUNTER — TELEPHONE (OUTPATIENT)
Dept: HEMATOLOGY/ONCOLOGY | Facility: HOSPITAL | Age: 55
End: 2020-01-16

## 2020-01-23 ENCOUNTER — OFFICE VISIT (OUTPATIENT)
Dept: HEMATOLOGY/ONCOLOGY | Age: 55
End: 2020-01-23
Attending: INTERNAL MEDICINE
Payer: COMMERCIAL

## 2020-01-26 RX ORDER — AMLODIPINE BESYLATE 5 MG/1
TABLET ORAL
Qty: 90 TABLET | Refills: 1 | Status: SHIPPED | OUTPATIENT
Start: 2020-01-26 | End: 2020-07-06

## 2020-01-26 NOTE — TELEPHONE ENCOUNTER
Refill passed per [facility] protocol.   Hypertensive Medications  Protocol Criteria:  · Appointment scheduled in the past 6 months or in the next 3 months  · BMP or CMP in the past 12 months  · Creatinine result < 2  Recent Outpatient Visits

## 2020-01-27 VITALS — BODY MASS INDEX: 36 KG/M2 | WEIGHT: 236.25 LBS

## 2020-01-27 NOTE — PROGRESS NOTES
NUTRITION F/U NOTE:     Date of visit: 1/23/2020     Diet Rx: high quality nutrition for wt loss and dz prevention     Pertinent Dx/PMH: DCIS     TX: s/p lumpectomy (pt declined RT and/or tamoxifen)      Other pertinent subjective/objective information: di of eating and portions. Pt noted having spouse take treats to his work so not in house to tempt. Pt reporting improved energy w/ changes in eating. She noted preparing most foods at home. She c/o \"afternoon slump\" leading to more munching before dinner.

## 2020-01-31 ENCOUNTER — APPOINTMENT (OUTPATIENT)
Dept: LAB | Age: 55
End: 2020-01-31
Attending: INTERNAL MEDICINE
Payer: COMMERCIAL

## 2020-01-31 DIAGNOSIS — E66.9 OBESITY (BMI 35.0-39.9 WITHOUT COMORBIDITY): ICD-10-CM

## 2020-01-31 LAB
ALBUMIN SERPL-MCNC: 4.2 G/DL (ref 3.4–5)
ALBUMIN/GLOB SERPL: 1.1 {RATIO} (ref 1–2)
ALP LIVER SERPL-CCNC: 116 U/L (ref 41–108)
ALT SERPL-CCNC: 23 U/L (ref 13–56)
ANION GAP SERPL CALC-SCNC: 7 MMOL/L (ref 0–18)
AST SERPL-CCNC: 19 U/L (ref 15–37)
BILIRUB SERPL-MCNC: 0.7 MG/DL (ref 0.1–2)
BUN BLD-MCNC: 12 MG/DL (ref 7–18)
BUN/CREAT SERPL: 13 (ref 10–20)
CALCIUM BLD-MCNC: 9.6 MG/DL (ref 8.5–10.1)
CHLORIDE SERPL-SCNC: 107 MMOL/L (ref 98–112)
CO2 SERPL-SCNC: 27 MMOL/L (ref 21–32)
CREAT BLD-MCNC: 0.92 MG/DL (ref 0.55–1.02)
GLOBULIN PLAS-MCNC: 3.9 G/DL (ref 2.8–4.4)
GLUCOSE BLD-MCNC: 105 MG/DL (ref 70–99)
INSULIN SERPL-ACNC: 9.6 MU/L (ref 3–25)
M PROTEIN MFR SERPL ELPH: 8.1 G/DL (ref 6.4–8.2)
OSMOLALITY SERPL CALC.SUM OF ELEC: 292 MOSM/KG (ref 275–295)
PATIENT FASTING Y/N/NP: YES
POTASSIUM SERPL-SCNC: 3.9 MMOL/L (ref 3.5–5.1)
SODIUM SERPL-SCNC: 141 MMOL/L (ref 136–145)

## 2020-01-31 PROCEDURE — 80053 COMPREHEN METABOLIC PANEL: CPT

## 2020-01-31 PROCEDURE — 83525 ASSAY OF INSULIN: CPT

## 2020-02-06 ENCOUNTER — OFFICE VISIT (OUTPATIENT)
Dept: INTERNAL MEDICINE CLINIC | Facility: CLINIC | Age: 55
End: 2020-02-06
Payer: COMMERCIAL

## 2020-02-06 VITALS
TEMPERATURE: 99 F | HEART RATE: 76 BPM | DIASTOLIC BLOOD PRESSURE: 92 MMHG | RESPIRATION RATE: 20 BRPM | HEIGHT: 68 IN | BODY MASS INDEX: 35.77 KG/M2 | SYSTOLIC BLOOD PRESSURE: 135 MMHG | WEIGHT: 236 LBS

## 2020-02-06 DIAGNOSIS — D05.12 BREAST NEOPLASM, TIS (DCIS), LEFT: Primary | ICD-10-CM

## 2020-02-06 DIAGNOSIS — E04.1 THYROID NODULE: ICD-10-CM

## 2020-02-06 DIAGNOSIS — E88.81 METABOLIC SYNDROME: ICD-10-CM

## 2020-02-06 DIAGNOSIS — R10.9 ABDOMINAL WALL PAIN: ICD-10-CM

## 2020-02-06 DIAGNOSIS — I10 ESSENTIAL HYPERTENSION: ICD-10-CM

## 2020-02-06 DIAGNOSIS — E66.9 OBESITY (BMI 35.0-39.9 WITHOUT COMORBIDITY): ICD-10-CM

## 2020-02-06 PROCEDURE — G0463 HOSPITAL OUTPT CLINIC VISIT: HCPCS | Performed by: INTERNAL MEDICINE

## 2020-02-06 PROCEDURE — 99214 OFFICE O/P EST MOD 30 MIN: CPT | Performed by: INTERNAL MEDICINE

## 2020-02-06 RX ORDER — TRIAMTERENE AND HYDROCHLOROTHIAZIDE 37.5; 25 MG/1; MG/1
CAPSULE ORAL
Qty: 30 CAPSULE | Refills: 1 | Status: SHIPPED | OUTPATIENT
Start: 2020-02-06 | End: 2020-05-01

## 2020-02-06 NOTE — ASSESSMENT & PLAN NOTE
Patient has been followed up per endocrinology. Ultrasound of the thyroid labs discussed. Continue to monitor at this time. Follow-up ultrasound and labs as per endocrinology.

## 2020-02-06 NOTE — PROGRESS NOTES
HPI:    Patient ID: Monica Kathleen is a 49year old female. Chief Complaint   Breast Cancer (saw Dr Marsa Bosworth on 11/25/19 and opted to try naturopathic route for now.  F/u mammo due May 2020.)  Hypertension   Thyroid Nodule (saw Dr Sara Martino on previous biopsy changes and atypical ductal hyperplasia (ADH) noted.     -In situ carcinoma is focally less than 1 mm from the nearest inferior soft tissue margin.   -In situ carcinoma is 1.5 mm from the nearest deep margin.      Electronically signed by Toñito Byers should follow-up with me to reconsider treatment. She verbalized understanding.        Etienne Holt M.D.  [facility]. Nothing aggravates the symptoms. Treatments tried: Patient underwent lumpectomy on October 29, 2019.   Has been FNA biopsy of nodules greater than 1cm in size  -Discussed that if nodule is benign then plan to follow with yearly thyroid ultrasound     Reviewed thyroid US in detail  She has two thyroid nodules: right sided sub cm nodule, left sided bigger nodule.  This program in March 2020     Pt actively participated verbalizing understanding of recommendations made. RD continued available.       HENT: Negative. Eyes: Negative. Respiratory: Negative. Cardiovascular: Negative.   Negative for palpitations and ort time. She appears well-developed and well-nourished. HENT:   Right Ear: External ear normal.   Left Ear: External ear normal.   Nose: Nose normal.   Mouth/Throat: Oropharynx is clear and moist. No oropharyngeal exudate.    Eyes: Pupils are equal, round, a Triamterene-HCTZ 37.5-25 MG Oral Cap    Other Relevant Orders    CBC WITH DIFFERENTIAL WITH PLATELET    COMP METABOLIC PANEL (14)    LIPID PANEL    INSULIN    Obesity (BMI 35.0-39.9 without comorbidity)     Body mass index is 35.88 kg/m².    Wt Readings if she has any recurrence for further management or interventions.              Other Visit Diagnoses     Metabolic syndrome        Relevant Orders    HEMOGLOBIN A1C    INSULIN          Return in about 3 months (around 5/6/2020), or if symptoms worsen or fa

## 2020-02-06 NOTE — ASSESSMENT & PLAN NOTE
Soreness in the left upper quadrant on and off for a few weeks but has resolved. Currently no pain or discomfort.   She has been in the gym and has been working on her core muscles–Pilates which probably aggravated the muscles on the lateral aspect of the

## 2020-02-06 NOTE — ASSESSMENT & PLAN NOTE
Body mass index is 35.88 kg/m².    Wt Readings from Last 6 Encounters:  02/06/20 : 236 lb (107 kg)  01/27/20 : 236 lb 4 oz (107.2 kg)  01/06/20 : 243 lb 6.4 oz (110.4 kg)  12/06/19 : 240 lb 3.2 oz (109 kg)  11/25/19 : 243 lb 12.8 oz (110.6 kg)  11/14/19 : 2

## 2020-02-06 NOTE — ASSESSMENT & PLAN NOTE
Blood pressure (!) 135/92, pulse 76, temperature 98.7 °F (37.1 °C), temperature source Oral, resp. rate 20, height 5' 8\" (1.727 m), weight 236 lb (107 kg), not currently breastfeeding. Blood pressure borderline elevated.   She is on amlodipine at 5 mg tim

## 2020-02-06 NOTE — PATIENT INSTRUCTIONS
Problem List Items Addressed This Visit        Unprioritized    Abdominal wall pain     Soreness in the left upper quadrant on and off for a few weeks but has resolved. Currently no pain or discomfort.   She has been in the gym and has been working on her nutritionist.  She has maintained her weight loss but has not lost much further. She is currently on metformin 750 mg 1 tablet with dinner which she seems to have tolerated well. Insulin levels remain elevated.   Strict diet controlled to restrict starche

## 2020-02-06 NOTE — ASSESSMENT & PLAN NOTE
Patient is status post lumpectomy for atypical ductal hyperplasia/DCIS on September 9, 2019. She has been monitored closely per mammograms. She is due for a follow-up evaluation. Advised to call [phone] to set up an appointment.     Patient has opted

## 2020-02-10 PROBLEM — D05.12 DUCTAL CARCINOMA IN SITU (DCIS) OF LEFT BREAST: Status: ACTIVE | Noted: 2020-02-10

## 2020-02-11 ENCOUNTER — TELEPHONE (OUTPATIENT)
Dept: HEMATOLOGY/ONCOLOGY | Facility: HOSPITAL | Age: 55
End: 2020-02-11

## 2020-02-12 ENCOUNTER — OFFICE VISIT (OUTPATIENT)
Dept: HEMATOLOGY/ONCOLOGY | Age: 55
End: 2020-02-12
Attending: INTERNAL MEDICINE
Payer: COMMERCIAL

## 2020-02-12 DIAGNOSIS — Z85.3 PERSONAL HISTORY OF BREAST CANCER: ICD-10-CM

## 2020-02-12 DIAGNOSIS — Z71.9 COUNSELING, UNSPECIFIED: ICD-10-CM

## 2020-02-12 DIAGNOSIS — Z08 ENCOUNTER FOR FOLLOW-UP EXAMINATION AFTER COMPLETED TREATMENT FOR MALIGNANT NEOPLASM: Primary | ICD-10-CM

## 2020-02-12 PROCEDURE — 99215 OFFICE O/P EST HI 40 MIN: CPT | Performed by: NURSE PRACTITIONER

## 2020-02-12 NOTE — PROGRESS NOTES
I met with Araceli for a Survivorship Clinic visit to provide a survivorship care plan (SCP) and information related to post-treatment care. She came to the visit alone. She has a diagnosis of left breast DCIS.   She had a left lumpectomy on 9/9/2019 an to continue to see specialists at usual intervals. Reviewed concerning symptoms that she should report to any Provider. Reviewed possible late and long-term effects related to the treatment that was received.   She has no issues with surgical left with the patient, 100% of that time was spent counseling regarding survivorship education and all of the information contained in the Oncology Treatment Summary SCP and in the additional resources provided.   Eran Boothe, APRN

## 2020-02-24 ENCOUNTER — HOSPITAL ENCOUNTER (OUTPATIENT)
Dept: MAMMOGRAPHY | Age: 55
Discharge: HOME | End: 2020-02-24
Attending: SURGERY
Payer: COMMERCIAL

## 2020-02-24 DIAGNOSIS — D05.12 NEOPLASM OF LEFT BREAST, PRIMARY TUMOR STAGING CATEGORY TIS: DUCTAL CARCINOMA IN SITU (DCIS): ICD-10-CM

## 2020-02-24 PROCEDURE — 77066 DX MAMMO INCL CAD BI: CPT | Performed by: SURGERY

## 2020-02-24 PROCEDURE — 77062 BREAST TOMOSYNTHESIS BI: CPT | Performed by: SURGERY

## 2020-03-05 ENCOUNTER — OFFICE VISIT (OUTPATIENT)
Dept: OBGYN CLINIC | Facility: CLINIC | Age: 55
End: 2020-03-05
Payer: COMMERCIAL

## 2020-03-05 VITALS
HEART RATE: 72 BPM | HEIGHT: 68 IN | DIASTOLIC BLOOD PRESSURE: 68 MMHG | SYSTOLIC BLOOD PRESSURE: 128 MMHG | BODY MASS INDEX: 36.37 KG/M2 | WEIGHT: 240 LBS

## 2020-03-05 DIAGNOSIS — Z12.11 ENCOUNTER FOR SCREENING COLONOSCOPY FOR NON-HIGH-RISK PATIENT: ICD-10-CM

## 2020-03-05 DIAGNOSIS — Z12.4 CERVICAL CANCER SCREENING: ICD-10-CM

## 2020-03-05 DIAGNOSIS — Z01.419 ENCOUNTER FOR WELL WOMAN EXAM WITH ROUTINE GYNECOLOGICAL EXAM: Primary | ICD-10-CM

## 2020-03-05 PROCEDURE — 87624 HPV HI-RISK TYP POOLED RSLT: CPT | Performed by: OBSTETRICS & GYNECOLOGY

## 2020-03-05 PROCEDURE — 88175 CYTOPATH C/V AUTO FLUID REDO: CPT | Performed by: OBSTETRICS & GYNECOLOGY

## 2020-03-05 PROCEDURE — 99396 PREV VISIT EST AGE 40-64: CPT | Performed by: OBSTETRICS & GYNECOLOGY

## 2020-03-05 NOTE — PROGRESS NOTES
Marilee Giron is a 49year old female  No LMP recorded (lmp unknown). (Menstrual status: Menopause). Patient presents with:  Wellness Visit  . Patient was diagnosed with right breast DCIS - had excision twice, BRCA negative, h/o 2 aunts level: Not on file    Occupational History      Occupation: self-employed, works as massage therapist and  custom     Social Needs      Financial resource strain: Not on file      Food insecurity:        Worry: Not on file        Inability: No Age of Onset   • Heart Disorder Father         palpitations   • Cancer Father 47        Leukemia    • Prostate Cancer Father 66   • Diabetes Mother         Type II   • Heart Disorder Mother         cardiac murmur   • Breast Cancer Maternal Aunt 36   • [facility] Runny nose      Review of Systems:  Constitutional:  Denies fatigue, night sweats, hot flashes  Eyes:  denies blurred or double vision  Cardiovascular:  denies chest pain or palpitations  Respiratory:  denies shortness of breath  Gastrointestinal: screening    Encounter for screening colonoscopy for non-high-risk patient  -     SURGERY - INTERNAL

## 2020-03-06 LAB — HPV I/H RISK 1 DNA SPEC QL NAA+PROBE: NEGATIVE

## 2020-03-08 NOTE — PROGRESS NOTES
[facility] Report of Consultation    Patient Name: Lamont Sheikh   YOB: 1965   Medical Record Number: [id number]   CSN: [id number]   Consulting Physician: Chay Miramontes MD  Referring Physician(s): Emi Morrissey MD  Date o inferior margin Deborrah Alecia)   • LUMPECTOMY LEFT Left 09/09/2019    DCIS;ADH       Family Medical History:  Family History   Problem Relation Age of Onset   • Heart Disorder Father         palpitations   • Cancer Father 47        Leukemia    • Prostate Cancer iodine strong 5 % Oral Solution, Take 4 drops by mouth daily. , Disp: , Rfl:   •  Ascorbic Acid (C-500/TORRES HIPS OR), Take 1 tablet by mouth daily. , Disp: , Rfl:   •  Multiple Vitamins-Minerals (MULTIVITAMIN ADULT OR), Take 1 tablet by mouth daily. , Disp: Labs:         Assessment and Plan:    # L breast DCIS (Tis Nx Mx): 49year old that underwent work-up for abnormal mammogram.  Biopsy showed ADH. Initially underwent excision biopsy followed by lumpectomy on 10/29/2019.   Final pathology-9 mm grade 3 DCI

## 2020-03-08 NOTE — PROGRESS NOTES
[facility] Progress Note      Patient Name:  Deidra Starr  YOB: 1965  Medical Record Number:  [id number]    Date of visit:  3/9/2020    CHIEF COMPLAINT: L breast DCIS.    HPI:     49year old that I initially saw in 1 Magnesium 100 MG Oral Tab Take by mouth. • Krill Oil 300 MG Oral Cap Take 1 tablet by mouth daily. • iodine strong 5 % Oral Solution Take 4 drops by mouth daily. • Ascorbic Acid (C-500/TORRES HIPS OR) Take 1 tablet by mouth daily.      • Multiple breast cancer. She will continue follow-up with her surgeon and continue with routine imaging. She may follow-up with me as needed or if she has any new problems. ORDERS PLACED:      Return for MD visit prn. Lashell Mccarty M.D.     [facility]

## 2020-03-09 ENCOUNTER — OFFICE VISIT (OUTPATIENT)
Dept: HEMATOLOGY/ONCOLOGY | Age: 55
End: 2020-03-09
Attending: INTERNAL MEDICINE
Payer: COMMERCIAL

## 2020-03-09 VITALS
DIASTOLIC BLOOD PRESSURE: 85 MMHG | TEMPERATURE: 98 F | OXYGEN SATURATION: 95 % | BODY MASS INDEX: 36 KG/M2 | HEART RATE: 91 BPM | WEIGHT: 238.81 LBS | RESPIRATION RATE: 20 BRPM | SYSTOLIC BLOOD PRESSURE: 140 MMHG

## 2020-03-09 DIAGNOSIS — D05.12 BREAST NEOPLASM, TIS (DCIS), LEFT: Primary | ICD-10-CM

## 2020-03-09 PROCEDURE — 99213 OFFICE O/P EST LOW 20 MIN: CPT | Performed by: INTERNAL MEDICINE

## 2020-03-09 NOTE — PROGRESS NOTES
Pt here for 4mth MD f/u visit for DCIS. Pt denies complaints.  Had mammogram done 2/24- here to review results with MD.   Education Record    Learner:  Patient    Disease / Diagnosis:BRCA    Barriers / Limitations:  None   Comments:    Method:  Brief focuse

## 2020-05-01 RX ORDER — TRIAMTERENE AND HYDROCHLOROTHIAZIDE 37.5; 25 MG/1; MG/1
CAPSULE ORAL
Qty: 36 CAPSULE | Refills: 1 | Status: SHIPPED | OUTPATIENT
Start: 2020-05-01 | End: 2021-07-06

## 2020-05-02 DIAGNOSIS — D05.12 NEOPLASM OF LEFT BREAST, PRIMARY TUMOR STAGING CATEGORY TIS: DUCTAL CARCINOMA IN SITU (DCIS): Primary | ICD-10-CM

## 2020-05-28 ENCOUNTER — TELEPHONE (OUTPATIENT)
Dept: INTERNAL MEDICINE CLINIC | Facility: CLINIC | Age: 55
End: 2020-05-28

## 2020-05-28 NOTE — TELEPHONE ENCOUNTER
Patient calling asking is she needs labs before her next appt in jULy    Informed has fasting labs but can drink water     Provided info to call Central scheduling at [phone]    Patient verbalizes understanding and agrees.

## 2020-07-06 ENCOUNTER — LAB ENCOUNTER (OUTPATIENT)
Dept: LAB | Age: 55
End: 2020-07-06
Attending: INTERNAL MEDICINE
Payer: COMMERCIAL

## 2020-07-06 DIAGNOSIS — E66.9 OBESITY (BMI 35.0-39.9 WITHOUT COMORBIDITY): ICD-10-CM

## 2020-07-06 DIAGNOSIS — I10 ESSENTIAL HYPERTENSION: ICD-10-CM

## 2020-07-06 DIAGNOSIS — E88.81 METABOLIC SYNDROME: ICD-10-CM

## 2020-07-06 LAB
ALBUMIN SERPL-MCNC: 3.8 G/DL (ref 3.4–5)
ALBUMIN/GLOB SERPL: 1.2 {RATIO} (ref 1–2)
ALP LIVER SERPL-CCNC: 98 U/L (ref 41–108)
ALT SERPL-CCNC: 21 U/L (ref 13–56)
ANION GAP SERPL CALC-SCNC: 5 MMOL/L (ref 0–18)
AST SERPL-CCNC: 24 U/L (ref 15–37)
BASOPHILS # BLD AUTO: 0.02 X10(3) UL (ref 0–0.2)
BASOPHILS NFR BLD AUTO: 0.3 %
BILIRUB SERPL-MCNC: 0.8 MG/DL (ref 0.1–2)
BUN BLD-MCNC: 14 MG/DL (ref 7–18)
BUN/CREAT SERPL: 18.2 (ref 10–20)
CALCIUM BLD-MCNC: 8.9 MG/DL (ref 8.5–10.1)
CHLORIDE SERPL-SCNC: 105 MMOL/L (ref 98–112)
CHOLEST SMN-MCNC: 193 MG/DL (ref ?–200)
CO2 SERPL-SCNC: 30 MMOL/L (ref 21–32)
CREAT BLD-MCNC: 0.77 MG/DL (ref 0.55–1.02)
DEPRECATED RDW RBC AUTO: 43.5 FL (ref 35.1–46.3)
EOSINOPHIL # BLD AUTO: 0 X10(3) UL (ref 0–0.7)
EOSINOPHIL NFR BLD AUTO: 0 %
ERYTHROCYTE [DISTWIDTH] IN BLOOD BY AUTOMATED COUNT: 12.9 % (ref 11–15)
EST. AVERAGE GLUCOSE BLD GHB EST-MCNC: 123 MG/DL (ref 68–126)
GLOBULIN PLAS-MCNC: 3.3 G/DL (ref 2.8–4.4)
GLUCOSE BLD-MCNC: 90 MG/DL (ref 70–99)
HBA1C MFR BLD HPLC: 5.9 % (ref ?–5.7)
HCT VFR BLD AUTO: 43.4 % (ref 35–48)
HDLC SERPL-MCNC: 47 MG/DL (ref 40–59)
HGB BLD-MCNC: 13.7 G/DL (ref 12–16)
IMM GRANULOCYTES # BLD AUTO: 0.01 X10(3) UL (ref 0–1)
IMM GRANULOCYTES NFR BLD: 0.2 %
INSULIN SERPL-ACNC: 6 MU/L (ref 3–25)
LDLC SERPL CALC-MCNC: 121 MG/DL (ref ?–100)
LYMPHOCYTES # BLD AUTO: 2.28 X10(3) UL (ref 1–4)
LYMPHOCYTES NFR BLD AUTO: 37.3 %
M PROTEIN MFR SERPL ELPH: 7.1 G/DL (ref 6.4–8.2)
MCH RBC QN AUTO: 29.3 PG (ref 26–34)
MCHC RBC AUTO-ENTMCNC: 31.6 G/DL (ref 31–37)
MCV RBC AUTO: 92.7 FL (ref 80–100)
MONOCYTES # BLD AUTO: 0.52 X10(3) UL (ref 0.1–1)
MONOCYTES NFR BLD AUTO: 8.5 %
NEUTROPHILS # BLD AUTO: 3.28 X10 (3) UL (ref 1.5–7.7)
NEUTROPHILS # BLD AUTO: 3.28 X10(3) UL (ref 1.5–7.7)
NEUTROPHILS NFR BLD AUTO: 53.7 %
NONHDLC SERPL-MCNC: 146 MG/DL (ref ?–130)
OSMOLALITY SERPL CALC.SUM OF ELEC: 290 MOSM/KG (ref 275–295)
PATIENT FASTING Y/N/NP: YES
PATIENT FASTING Y/N/NP: YES
PLATELET # BLD AUTO: 294 10(3)UL (ref 150–450)
POTASSIUM SERPL-SCNC: 3.9 MMOL/L (ref 3.5–5.1)
RBC # BLD AUTO: 4.68 X10(6)UL (ref 3.8–5.3)
SODIUM SERPL-SCNC: 140 MMOL/L (ref 136–145)
TRIGL SERPL-MCNC: 126 MG/DL (ref 30–149)
VLDLC SERPL CALC-MCNC: 25 MG/DL (ref 0–30)
WBC # BLD AUTO: 6.1 X10(3) UL (ref 4–11)

## 2020-07-06 PROCEDURE — 80053 COMPREHEN METABOLIC PANEL: CPT

## 2020-07-06 PROCEDURE — 83525 ASSAY OF INSULIN: CPT

## 2020-07-06 PROCEDURE — 80061 LIPID PANEL: CPT

## 2020-07-06 PROCEDURE — 83036 HEMOGLOBIN GLYCOSYLATED A1C: CPT

## 2020-07-06 PROCEDURE — 85025 COMPLETE CBC W/AUTO DIFF WBC: CPT

## 2020-07-06 RX ORDER — METFORMIN HYDROCHLORIDE 750 MG/1
750 TABLET, EXTENDED RELEASE ORAL DAILY
Qty: 90 TABLET | Refills: 1 | Status: SHIPPED | OUTPATIENT
Start: 2020-07-06 | End: 2020-12-10

## 2020-07-06 RX ORDER — AMLODIPINE BESYLATE 5 MG/1
5 TABLET ORAL DAILY
Qty: 90 TABLET | Refills: 1 | Status: SHIPPED | OUTPATIENT
Start: 2020-07-06 | End: 2020-12-10

## 2020-07-09 ENCOUNTER — OFFICE VISIT (OUTPATIENT)
Dept: INTERNAL MEDICINE CLINIC | Facility: CLINIC | Age: 55
End: 2020-07-09
Payer: COMMERCIAL

## 2020-07-09 VITALS
RESPIRATION RATE: 18 BRPM | DIASTOLIC BLOOD PRESSURE: 85 MMHG | HEART RATE: 78 BPM | HEIGHT: 68 IN | SYSTOLIC BLOOD PRESSURE: 133 MMHG | WEIGHT: 231 LBS | BODY MASS INDEX: 35.01 KG/M2

## 2020-07-09 DIAGNOSIS — E04.1 THYROID NODULE: ICD-10-CM

## 2020-07-09 DIAGNOSIS — E78.5 HYPERLIPIDEMIA, UNSPECIFIED HYPERLIPIDEMIA TYPE: ICD-10-CM

## 2020-07-09 DIAGNOSIS — E66.9 OBESITY (BMI 35.0-39.9 WITHOUT COMORBIDITY): Primary | ICD-10-CM

## 2020-07-09 DIAGNOSIS — D05.12 BREAST NEOPLASM, TIS (DCIS), LEFT: ICD-10-CM

## 2020-07-09 DIAGNOSIS — I10 ESSENTIAL HYPERTENSION: ICD-10-CM

## 2020-07-09 PROBLEM — N60.92 ATYPICAL DUCTAL HYPERPLASIA OF LEFT BREAST: Status: RESOLVED | Noted: 2019-09-10 | Resolved: 2020-07-09

## 2020-07-09 PROBLEM — R22.1 MASS OF LATERAL NECK: Status: RESOLVED | Noted: 2019-11-14 | Resolved: 2020-07-09

## 2020-07-09 PROBLEM — R92.8 ABNORMAL MAMMOGRAM: Status: RESOLVED | Noted: 2019-08-14 | Resolved: 2020-07-09

## 2020-07-09 PROBLEM — R10.9 ABDOMINAL WALL PAIN: Status: RESOLVED | Noted: 2020-02-06 | Resolved: 2020-07-09

## 2020-07-09 PROCEDURE — 99212 OFFICE O/P EST SF 10 MIN: CPT | Performed by: INTERNAL MEDICINE

## 2020-07-09 PROCEDURE — 99214 OFFICE O/P EST MOD 30 MIN: CPT | Performed by: INTERNAL MEDICINE

## 2020-07-09 NOTE — ASSESSMENT & PLAN NOTE
Body mass index is 35.12 kg/m².    Wt Readings from Last 6 Encounters:  07/09/20 : 231 lb (104.8 kg)  03/09/20 : 238 lb 12.8 oz (108.3 kg)  03/05/20 : 240 lb (108.9 kg)  02/06/20 : 236 lb (107 kg)  01/27/20 : 236 lb 4 oz (107.2 kg)  01/06/20 : 243 lb 6.4 oz

## 2020-07-09 NOTE — ASSESSMENT & PLAN NOTE
Blood pressure 133/85, pulse 78, resp. rate 18, height 5' 8\" (1.727 m), weight 231 lb (104.8 kg), not currently breastfeeding. Blood pressures improved. Continue on amlodipine 5 mg daily. Continue on Dyazide 37.5/25 1 capsule about 2-3 times a week.   R

## 2020-07-09 NOTE — ASSESSMENT & PLAN NOTE
Patient is status post lumpectomy 9/19 for 9 mm grade 3 DCIS, 20% ER, 15% TX. Had repeat excision of close margin in 10/19, no additional tumor noted. Declined RT and endocrine therapy.       Bilateral mammogram 2/20 did not show any abnormalities, six-

## 2020-07-09 NOTE — PATIENT INSTRUCTIONS
Problem List Items Addressed This Visit        Unprioritized    Breast neoplasm, Tis (DCIS), left     Patient is status post lumpectomy 9/19 for 9 mm grade 3 DCIS, 20% ER, 15% DC. Had repeat excision of close margin in 10/19, no additional tumor noted. upcoming appointment with endocrinology  Thyroid function test look normal

## 2020-07-09 NOTE — PROGRESS NOTES
HPI:    Patient ID: Valdez Gardiner is a 48year old female. Written by Ruma Palafox MD on 7/7/2020  7:37 AM   Cholesterol panel shows improvement-LDL cholesterol is down to 121.    Sugars, kidney functions, liver functions and electrolytes abnormal mammogram.  Biopsy showed ADH. Initially underwent excision biopsy followed by lumpectomy on 10/29/2019. Final pathology-9 mm grade 3 DCIS, 20% ER, 15% OK.   Final margins negative.     I discussed the natural history, course, prognosis and treat tried: Patient underwent lumpectomy on October 29, 2019. Has been under the care of a naturopathic doctor to reduce estrogen levels on aromatase levels. Defers radiation treatment and medical management at this time. Has been followed up per oncology. This was biopsied and was benign in 2017 ( per patient)  VERONICA signed for information regarding old US results and FNA results  Will review old US results to make sure nodule has not changed in size/ features     She will repeat TSH in summer 2020  She will daily.     • Turmeric Does not apply Powder by Does not apply route daily. • Krill Oil 300 MG Oral Cap Take 1 tablet by mouth daily. • Cholecalciferol (VITAMIN D-3 OR) Take 1 tablet by mouth daily. 1000 UNITS        Allergies:   Other reflexes. No cranial nerve deficit. She exhibits normal muscle tone. Coordination normal.   Skin: No rash noted. No erythema. Psychiatric: She has a normal mood and affect.  Her behavior is normal. Thought content normal.   Nursing note and vitals reviewe six-month bilateral mammogram is due 8/20. She is currently on  natural Chinese supplements to help with the breast cancer. She is due to complete her mammogram in August and follow-up with Dr. William Pederson.          Hyperlipidemia     Panel improved at

## 2020-07-09 NOTE — ASSESSMENT & PLAN NOTE
Panel improved at this time but LDL levels remain elevated. Recheck prior to the next office visit in about 4 to 5 months.

## 2020-07-09 NOTE — ASSESSMENT & PLAN NOTE
Ultrasound of the thyroid needs repetition. Orders were reprinted for completion.   She has an upcoming appointment with endocrinology  Thyroid function test look normal

## 2020-07-17 ENCOUNTER — TELEPHONE (OUTPATIENT)
Dept: SURGERY | Facility: CLINIC | Age: 55
End: 2020-07-17

## 2020-07-17 NOTE — TELEPHONE ENCOUNTER
Returned pt phone call regarding questions for her upcoming appt. Informed pt that we do not need bloodwork done. Pt states she has an upcoming mamm schedule. Informed her this is all that she will need prior to the appt. Pt verbalized understanding.

## 2020-08-13 ENCOUNTER — HOSPITAL ENCOUNTER (OUTPATIENT)
Dept: MAMMOGRAPHY | Age: 55
Discharge: HOME | End: 2020-08-13
Attending: SURGERY
Payer: COMMERCIAL

## 2020-08-13 DIAGNOSIS — D05.12 NEOPLASM OF LEFT BREAST, PRIMARY TUMOR STAGING CATEGORY TIS: DUCTAL CARCINOMA IN SITU (DCIS): ICD-10-CM

## 2020-08-13 PROCEDURE — 77066 DX MAMMO INCL CAD BI: CPT | Performed by: SURGERY

## 2020-08-13 PROCEDURE — 77062 BREAST TOMOSYNTHESIS BI: CPT | Performed by: SURGERY

## 2020-08-21 ENCOUNTER — OFFICE VISIT (OUTPATIENT)
Dept: SURGERY | Facility: CLINIC | Age: 55
End: 2020-08-21
Payer: COMMERCIAL

## 2020-08-21 VITALS
TEMPERATURE: 98 F | OXYGEN SATURATION: 96 % | RESPIRATION RATE: 18 BRPM | HEART RATE: 91 BPM | DIASTOLIC BLOOD PRESSURE: 85 MMHG | SYSTOLIC BLOOD PRESSURE: 123 MMHG

## 2020-08-21 DIAGNOSIS — D05.12 BREAST NEOPLASM, TIS (DCIS), LEFT: Primary | ICD-10-CM

## 2020-08-21 PROCEDURE — 3079F DIAST BP 80-89 MM HG: CPT | Performed by: SURGERY

## 2020-08-21 PROCEDURE — 3074F SYST BP LT 130 MM HG: CPT | Performed by: SURGERY

## 2020-08-21 PROCEDURE — 99214 OFFICE O/P EST MOD 30 MIN: CPT | Performed by: SURGERY

## 2020-08-21 NOTE — PROGRESS NOTES
Breast Surgery Surveillance Visit    Diagnosis: Left breast atypical ductal hyperplasia upgraded to DCIS status post lumpectomy on September 9, 2019 no status post reexcision of inferior margin on October 29, 2019.     Stage: Cancer Staging  Breast neoplasm Date   • Atypical ductal hyperplasia of left breast     Surgery scheduled 09/09/19   • Breast CA ([facility] [address] 09/2019    DCIS   • Ductal carcinoma in situ of breast 09/2019   • Essential hypertension    • Granuloma annulare    • Hemorrhoids    • High blood pressu tablet by mouth daily. 1000 UNITS , Disp: , Rfl:   Probiotic Product (PROBIOTIC DAILY OR), Take 1 capsule by mouth daily. , Disp: , Rfl:   Turmeric Does not apply Powder, by Does not apply route daily. , Disp: , Rfl:         Allergies:       Other voice, facial trauma.     Respiratory:  The patient denies chronic cough, phlegm, hemoptysis, pleurisy/chest pain, pneumonia, asthma, wheezing, difficulty in breathing with exertion, emphysema, chronic bronchitis, shortness of breath or abnormal sound when problems, cold intolerance, thyroid disease. Allergic/Immunologic:  There is no history of hives, hay fever, angioedema or anaphylaxis.     /85 (BP Location: Right arm, Patient Position: Sitting, Cuff Size: large)   Pulse 91   Temp 97.8 °F (36.6 ° monitoring her ROM and strength and explained that a referral to physical therapy may be warranted in the future if she identifies any limitations or restrictions.  She was encouraged to contact the office with any questions or concerns prior to her next sc

## 2020-08-31 ENCOUNTER — LAB ENCOUNTER (OUTPATIENT)
Dept: LAB | Age: 55
End: 2020-08-31
Attending: INTERNAL MEDICINE
Payer: COMMERCIAL

## 2020-08-31 ENCOUNTER — HOSPITAL ENCOUNTER (OUTPATIENT)
Dept: ULTRASOUND IMAGING | Age: 55
Discharge: HOME | End: 2020-08-31
Attending: INTERNAL MEDICINE
Payer: COMMERCIAL

## 2020-08-31 DIAGNOSIS — E04.1 THYROID NODULE: ICD-10-CM

## 2020-08-31 LAB — TSI SER-ACNC: 1.35 MIU/ML (ref 0.36–3.74)

## 2020-08-31 PROCEDURE — 84443 ASSAY THYROID STIM HORMONE: CPT

## 2020-08-31 PROCEDURE — 76536 US EXAM OF HEAD AND NECK: CPT | Performed by: INTERNAL MEDICINE

## 2020-09-08 ENCOUNTER — OFFICE VISIT (OUTPATIENT)
Dept: ENDOCRINOLOGY CLINIC | Facility: CLINIC | Age: 55
End: 2020-09-08
Payer: COMMERCIAL

## 2020-09-08 VITALS
WEIGHT: 231 LBS | HEIGHT: 68 IN | SYSTOLIC BLOOD PRESSURE: 158 MMHG | HEART RATE: 60 BPM | RESPIRATION RATE: 16 BRPM | BODY MASS INDEX: 35.01 KG/M2 | DIASTOLIC BLOOD PRESSURE: 92 MMHG

## 2020-09-08 DIAGNOSIS — E04.1 THYROID NODULE: Primary | ICD-10-CM

## 2020-09-08 PROCEDURE — 3080F DIAST BP >= 90 MM HG: CPT | Performed by: INTERNAL MEDICINE

## 2020-09-08 PROCEDURE — 3008F BODY MASS INDEX DOCD: CPT | Performed by: INTERNAL MEDICINE

## 2020-09-08 PROCEDURE — 99213 OFFICE O/P EST LOW 20 MIN: CPT | Performed by: INTERNAL MEDICINE

## 2020-09-08 PROCEDURE — 3077F SYST BP >= 140 MM HG: CPT | Performed by: INTERNAL MEDICINE

## 2020-09-08 NOTE — PROGRESS NOTES
Return Office Visit     CHIEF COMPLAINT:  Patient presents with:   Follow - Up: Tyroid nodule  Lab Results       HISTORY OF PRESENT ILLNESS:  Jamel Krueger is a 48year old female who presents for follow up for of thyroid nodule  Noted on PE i history marked as reviewed.     ASSESSMENTS:     REVIEW OF SYSTEMS:  Constitutional: Negative for: weight change, fever, fatigue, cold/heat intolerance  Eyes: Negative for:  Visual changes, proptosis, blurring  ENT: Negative for:  dysphagia, neck swelling, in size  -Discussed that if nodule is benign then plan to follow with yearly thyroid ultrasound     Reviewed thyroid US in detail  She had two thyroid nodules: right sided sub cm nodule, left sided bigger nodule.  This was biopsied and was benign in 2017 (

## 2020-09-09 ENCOUNTER — TELEPHONE (OUTPATIENT)
Dept: INTERNAL MEDICINE CLINIC | Facility: CLINIC | Age: 55
End: 2020-09-09

## 2020-09-09 NOTE — TELEPHONE ENCOUNTER
Brian Love I received a call from pt that her blood work done on 7/6/2020 is not being covered by her insurance as it was not coded as routine blood work.  Pt was informed that you need to fax a letter to   [facility] [employer]  )and let

## 2020-09-10 NOTE — TELEPHONE ENCOUNTER
Original orders with new diagnosis code and STEFANIE's signature/date faxed to Billing/coding at [phone] and [phone], w/confirmations. Pt was called and notified.

## 2020-09-10 NOTE — TELEPHONE ENCOUNTER
Patient calling again. She would like a call when the situation below has been addressed and sent for correction.

## 2020-09-21 ENCOUNTER — APPOINTMENT (OUTPATIENT)
Dept: LAB | Age: 55
End: 2020-09-21
Attending: INTERNAL MEDICINE
Payer: COMMERCIAL

## 2020-09-21 DIAGNOSIS — I10 ESSENTIAL HYPERTENSION: ICD-10-CM

## 2020-09-21 LAB
ALBUMIN SERPL-MCNC: 3.9 G/DL (ref 3.4–5)
ALBUMIN/GLOB SERPL: 1 {RATIO} (ref 1–2)
ALP LIVER SERPL-CCNC: 101 U/L
ALT SERPL-CCNC: 34 U/L
ANION GAP SERPL CALC-SCNC: 4 MMOL/L (ref 0–18)
AST SERPL-CCNC: 27 U/L (ref 15–37)
BILIRUB SERPL-MCNC: 0.9 MG/DL (ref 0.1–2)
BUN BLD-MCNC: 14 MG/DL (ref 7–18)
BUN/CREAT SERPL: 16.9 (ref 10–20)
CALCIUM BLD-MCNC: 9.1 MG/DL (ref 8.5–10.1)
CHLORIDE SERPL-SCNC: 105 MMOL/L (ref 98–112)
CHOLEST SMN-MCNC: 219 MG/DL (ref ?–200)
CO2 SERPL-SCNC: 29 MMOL/L (ref 21–32)
CREAT BLD-MCNC: 0.83 MG/DL
GLOBULIN PLAS-MCNC: 4.1 G/DL (ref 2.8–4.4)
GLUCOSE BLD-MCNC: 91 MG/DL (ref 70–99)
HDLC SERPL-MCNC: 60 MG/DL (ref 40–59)
LDLC SERPL CALC-MCNC: 136 MG/DL (ref ?–100)
M PROTEIN MFR SERPL ELPH: 8 G/DL (ref 6.4–8.2)
NONHDLC SERPL-MCNC: 159 MG/DL (ref ?–130)
OSMOLALITY SERPL CALC.SUM OF ELEC: 286 MOSM/KG (ref 275–295)
PATIENT FASTING Y/N/NP: YES
PATIENT FASTING Y/N/NP: YES
POTASSIUM SERPL-SCNC: 3.8 MMOL/L (ref 3.5–5.1)
SODIUM SERPL-SCNC: 138 MMOL/L (ref 136–145)
TRIGL SERPL-MCNC: 113 MG/DL (ref 30–149)
VLDLC SERPL CALC-MCNC: 23 MG/DL (ref 0–30)

## 2020-09-21 PROCEDURE — 80053 COMPREHEN METABOLIC PANEL: CPT

## 2020-09-21 PROCEDURE — 80061 LIPID PANEL: CPT

## 2020-09-29 ENCOUNTER — OFFICE VISIT (OUTPATIENT)
Dept: INTERNAL MEDICINE CLINIC | Facility: CLINIC | Age: 55
End: 2020-09-29
Payer: COMMERCIAL

## 2020-09-29 VITALS
SYSTOLIC BLOOD PRESSURE: 136 MMHG | DIASTOLIC BLOOD PRESSURE: 80 MMHG | WEIGHT: 230.69 LBS | HEART RATE: 64 BPM | HEIGHT: 68 IN | BODY MASS INDEX: 34.96 KG/M2

## 2020-09-29 DIAGNOSIS — D05.12 BREAST NEOPLASM, TIS (DCIS), LEFT: ICD-10-CM

## 2020-09-29 DIAGNOSIS — E04.1 THYROID NODULE: ICD-10-CM

## 2020-09-29 DIAGNOSIS — E78.5 HYPERLIPIDEMIA, UNSPECIFIED HYPERLIPIDEMIA TYPE: ICD-10-CM

## 2020-09-29 DIAGNOSIS — R10.12 LEFT UPPER QUADRANT ABDOMINAL PAIN: Primary | ICD-10-CM

## 2020-09-29 DIAGNOSIS — R22.1 MASS OF LEFT SIDE OF NECK: ICD-10-CM

## 2020-09-29 DIAGNOSIS — I10 ESSENTIAL HYPERTENSION: ICD-10-CM

## 2020-09-29 DIAGNOSIS — Z12.11 SCREENING FOR MALIGNANT NEOPLASM OF COLON: ICD-10-CM

## 2020-09-29 PROCEDURE — 99212 OFFICE O/P EST SF 10 MIN: CPT | Performed by: INTERNAL MEDICINE

## 2020-09-29 PROCEDURE — 99214 OFFICE O/P EST MOD 30 MIN: CPT | Performed by: INTERNAL MEDICINE

## 2020-09-29 PROCEDURE — 3079F DIAST BP 80-89 MM HG: CPT | Performed by: INTERNAL MEDICINE

## 2020-09-29 PROCEDURE — 3075F SYST BP GE 130 - 139MM HG: CPT | Performed by: INTERNAL MEDICINE

## 2020-09-29 PROCEDURE — 90715 TDAP VACCINE 7 YRS/> IM: CPT | Performed by: INTERNAL MEDICINE

## 2020-09-29 PROCEDURE — 90471 IMMUNIZATION ADMIN: CPT | Performed by: INTERNAL MEDICINE

## 2020-09-29 PROCEDURE — 3008F BODY MASS INDEX DOCD: CPT | Performed by: INTERNAL MEDICINE

## 2020-09-29 NOTE — PROGRESS NOTES
HPI:    Patient ID: Valdez Gardiner is a 48year old female. Lipid panel shows elevation in the LDL cholesterol at 136. Kidney functions, liver functions, electrolytes and calcium levels look normal.  We will discuss this at the visit.     P months     No orders of the defined types were placed in this encounter.        9/8/2020  Iasac Abrams MD    Hypertension  This is a chronic problem. The current episode started more than 1 year ago.  The problem has been gradually improving since onset tumor.     B. Left breast, 9:00, final inferior margin, excision:  -Breast tissue with patchy stromal fibrosis. -Margin negative for tumor.   Per oncology consult-f/u  Assessment and Plan:     # L breast DCIS (Tis Nx Mx): 49year old that underwent work-u some natural Chinese supplements to help with the breast cancer. She will continue follow-up with her surgeon and continue with routine imaging. She may follow-up with me as needed or if she has any new problems.     Per Dr Maciej Ordoñez 8/2020    Imaging:  Her time.  Has been followed up per oncology. Thyroid Problem  This is a recurrent problem. The current episode started more than 1 year ago. The problem occurs constantly. The problem has been waxing and waning (Patient is monitored per endocrinology.   Ultr and aching. The abdominal pain does not radiate. Pertinent negatives include no anorexia. Associated symptoms comments:  Wt Readings from Last 6 Encounters:  09/29/20 : 230 lb 11.2 oz (104.6 kg)  09/08/20 : 231 lb (104.8 kg)  07/09/20 : 231 lb (104.8 kg)  0 (MULTIVITAMIN ADULT OR) Take 1 tablet by mouth daily. • Cholecalciferol (VITAMIN D-3 OR) Take 1 tablet by mouth daily. 1000 UNITS      • Probiotic Product (PROBIOTIC DAILY OR) Take 1 capsule by mouth daily.      • Turmeric Does not apply Powder by Does behavior is normal. Thought content normal.   Nursing note and vitals reviewed. ASSESSMENT/PLAN:     Problem List Items Addressed This Visit        Unprioritized    Thyroid nodule     Persistent but stable ultrasound of the thyroid.   6-month fo option #2           Relevant Orders    CARD TREADMILL STRESS, ADULT (CPT=93017)    SARS-COV-2 BY PCR ()    Left upper quadrant abdominal pain - Primary     Patient with complaints of left upper quadrant pain extending into the epigastrium random occur Smoker dx age 67's   • DCIS Self    • Breast Cancer Self 49   • No Known Problems Son    • No Known Problems Son    • Other (Kidney Cancer) Paternal Uncle 68     Patient with a history of multiple polyps and has had about 3 colonoscopies at this time.   Cur

## 2020-09-29 NOTE — ASSESSMENT & PLAN NOTE
Persistent soft mass at the nape of the neck on the left side. No tenderness but gradually enlarging. She was advised to follow-up with a CT scan of the soft tissue of the neck on the right back but has not done so.   Repeat orders provided at this time a

## 2020-09-29 NOTE — ASSESSMENT & PLAN NOTE
Patient with complaints of left upper quadrant pain extending into the epigastrium random occurrences but recently more frequent. Symptoms gradually worse over the past 1 year.   She needs to stay immobile, and that the pain past before she is able to cont

## 2020-09-29 NOTE — ASSESSMENT & PLAN NOTE
Colonoscopy due on 01/01/2018    Family History   Problem Relation Age of Onset   • Heart Disorder Father         palpitations   • Cancer Father 47        Leukemia    • Prostate Cancer Father 66   • Pancreatic Cancer Father 80   • Diabetes Mother         T

## 2020-09-29 NOTE — ASSESSMENT & PLAN NOTE
20Patient is status post lumpectomy on September 11th 2019 for 9 mm grade 3 DCIS, 20% ER, 15% AL. Had a complete excision because of close margins on October 19 and no additional medullary lesions noted.   She declined both and endocrine therapy as well as

## 2020-09-29 NOTE — ASSESSMENT & PLAN NOTE
Persistent but stable ultrasound of the thyroid. 6-month follow-up as per discussion with endocrinology. Not palpable tenderness or discomfort.

## 2020-09-29 NOTE — ASSESSMENT & PLAN NOTE
Blood pressure 136/80, pulse 64, height 5' 8\" (1.727 m), weight 230 lb 11.2 oz (104.6 kg), not currently breastfeeding. Blood pressure, well controlled at this time upon recheck. Continue on amlodipine 5 mg daily, Dyazide 1 capsule 2-3 times a week.   Ki

## 2020-09-29 NOTE — ASSESSMENT & PLAN NOTE
Persistent elevation of LDL cholesterol, 136 at this time. Liver function tests are okay. Patient reluctant to start on medication. There is family history of heart disease. Treadmill stress test has been ordered.   Consider a CT calcium scoring heart s

## 2020-10-05 ENCOUNTER — TELEPHONE (OUTPATIENT)
Dept: INTERNAL MEDICINE CLINIC | Facility: CLINIC | Age: 55
End: 2020-10-05

## 2020-10-05 DIAGNOSIS — R22.1 MASS OF LEFT SIDE OF NECK: Primary | ICD-10-CM

## 2020-10-05 NOTE — TELEPHONE ENCOUNTER
Dori Davila, from [facility] states the order for patient's CT Soft Tissue of Neck should be with contrast as this is their protocol when there is a mass. Patient is schedule to have CT on 10/12.

## 2020-10-06 NOTE — TELEPHONE ENCOUNTER
Left message for [facility] radiology letting them know CT soft tissue was changed to be with contrast

## 2020-10-12 ENCOUNTER — HOSPITAL ENCOUNTER (OUTPATIENT)
Dept: CT IMAGING | Age: 55
Discharge: HOME | End: 2020-10-12
Attending: INTERNAL MEDICINE
Payer: COMMERCIAL

## 2020-10-12 DIAGNOSIS — R10.12 LEFT UPPER QUADRANT ABDOMINAL PAIN: ICD-10-CM

## 2020-10-12 DIAGNOSIS — R22.1 MASS OF LEFT SIDE OF NECK: ICD-10-CM

## 2020-10-12 PROCEDURE — 70491 CT SOFT TISSUE NECK W/DYE: CPT | Performed by: INTERNAL MEDICINE

## 2020-10-12 PROCEDURE — 74177 CT ABD & PELVIS W/CONTRAST: CPT | Performed by: INTERNAL MEDICINE

## 2020-10-13 ENCOUNTER — APPOINTMENT (OUTPATIENT)
Dept: LAB | Age: 55
End: 2020-10-13
Attending: INTERNAL MEDICINE
Payer: COMMERCIAL

## 2020-10-13 ENCOUNTER — APPOINTMENT (OUTPATIENT)
Dept: CT IMAGING | Age: 55
End: 2020-10-13
Attending: INTERNAL MEDICINE
Payer: COMMERCIAL

## 2020-10-13 DIAGNOSIS — I10 ESSENTIAL HYPERTENSION: ICD-10-CM

## 2020-10-13 DIAGNOSIS — E78.5 HYPERLIPIDEMIA, UNSPECIFIED HYPERLIPIDEMIA TYPE: ICD-10-CM

## 2020-10-14 ENCOUNTER — TELEPHONE (OUTPATIENT)
Dept: INTERNAL MEDICINE CLINIC | Facility: CLINIC | Age: 55
End: 2020-10-14

## 2020-10-16 ENCOUNTER — HOSPITAL ENCOUNTER (OUTPATIENT)
Dept: CV DIAGNOSTICS | Age: 55
Discharge: HOME | End: 2020-10-16
Attending: INTERNAL MEDICINE
Payer: COMMERCIAL

## 2020-10-16 ENCOUNTER — HOSPITAL ENCOUNTER (OUTPATIENT)
Dept: CT IMAGING | Age: 55
Discharge: HOME | End: 2020-10-16
Attending: INTERNAL MEDICINE
Payer: COMMERCIAL

## 2020-10-16 DIAGNOSIS — I10 ESSENTIAL HYPERTENSION: ICD-10-CM

## 2020-10-16 DIAGNOSIS — E78.5 HYPERLIPIDEMIA, UNSPECIFIED HYPERLIPIDEMIA TYPE: ICD-10-CM

## 2020-10-16 DIAGNOSIS — Z13.9 ENCOUNTER FOR SCREENING: ICD-10-CM

## 2020-10-16 PROCEDURE — 93018 CV STRESS TEST I&R ONLY: CPT | Performed by: INTERNAL MEDICINE

## 2020-10-16 PROCEDURE — 93017 CV STRESS TEST TRACING ONLY: CPT | Performed by: INTERNAL MEDICINE

## 2020-10-19 ENCOUNTER — TELEPHONE (OUTPATIENT)
Dept: INTERNAL MEDICINE CLINIC | Facility: CLINIC | Age: 55
End: 2020-10-19

## 2020-10-19 NOTE — TELEPHONE ENCOUNTER
Patient states she reviewed CT abdomen results. She noticed there is a section that states she has a small hiatal hernia. She asked if the hernia could be cause of her left abdominal pain.  States pain occurs when pushing, pulling or performing weight b

## 2020-11-21 ENCOUNTER — APPOINTMENT (OUTPATIENT)
Dept: LAB | Age: 55
End: 2020-11-21
Attending: INTERNAL MEDICINE
Payer: COMMERCIAL

## 2020-11-21 DIAGNOSIS — Z01.818 PREOP EXAMINATION: ICD-10-CM

## 2020-11-24 PROBLEM — K29.30 CHRONIC SUPERFICIAL GASTRITIS: Status: ACTIVE | Noted: 2020-11-24

## 2020-11-24 PROBLEM — K21.9 ESOPHAGEAL REFLUX: Status: ACTIVE | Noted: 2020-11-24

## 2020-12-03 ENCOUNTER — OFFICE VISIT (OUTPATIENT)
Dept: INTERNAL MEDICINE CLINIC | Facility: CLINIC | Age: 55
End: 2020-12-03
Payer: COMMERCIAL

## 2020-12-03 VITALS
HEIGHT: 66 IN | RESPIRATION RATE: 20 BRPM | DIASTOLIC BLOOD PRESSURE: 81 MMHG | BODY MASS INDEX: 36.74 KG/M2 | TEMPERATURE: 97 F | WEIGHT: 228.63 LBS | SYSTOLIC BLOOD PRESSURE: 138 MMHG | HEART RATE: 80 BPM

## 2020-12-03 DIAGNOSIS — I10 ESSENTIAL HYPERTENSION: ICD-10-CM

## 2020-12-03 DIAGNOSIS — E78.5 HYPERLIPIDEMIA, UNSPECIFIED HYPERLIPIDEMIA TYPE: ICD-10-CM

## 2020-12-03 DIAGNOSIS — Z00.00 ROUTINE PHYSICAL EXAMINATION: Primary | ICD-10-CM

## 2020-12-03 DIAGNOSIS — D05.12 BREAST NEOPLASM, TIS (DCIS), LEFT: ICD-10-CM

## 2020-12-03 DIAGNOSIS — Z00.00 WELLNESS EXAMINATION: ICD-10-CM

## 2020-12-03 DIAGNOSIS — R22.1 MASS OF LEFT SIDE OF NECK: ICD-10-CM

## 2020-12-03 PROCEDURE — 3079F DIAST BP 80-89 MM HG: CPT | Performed by: INTERNAL MEDICINE

## 2020-12-03 PROCEDURE — 3008F BODY MASS INDEX DOCD: CPT | Performed by: INTERNAL MEDICINE

## 2020-12-03 PROCEDURE — 99396 PREV VISIT EST AGE 40-64: CPT | Performed by: INTERNAL MEDICINE

## 2020-12-03 PROCEDURE — 3075F SYST BP GE 130 - 139MM HG: CPT | Performed by: INTERNAL MEDICINE

## 2020-12-03 NOTE — PATIENT INSTRUCTIONS
Problem List Items Addressed This Visit        Unprioritized    Breast neoplasm, Tis (DCIS), left     Patient is status post lumpectomy on September 11, 2019 for a 9 mm grade 3 DCIS, 20% ER, 15% CO.   Had a complete excision because of close margins on Octo 02/13/2021    No cervical or inguinal lymphadenopathy. Hernial orifices intact. Rectal exam normal,no palpable abnormalities.   Hemorrhoids-small internal present  Katy Latus negetive  Colonoscopy due on 11/24/2030    Pelvic exam completed–no cerv

## 2020-12-03 NOTE — PROGRESS NOTES
HPI:   Roscoe Oleary is a 48year old female who presents for an Annual Health Visit.      CT scan of the neck shows a small fatty tumor in the upper portion of the left side of the neck–this seems to be into portions 1 about 2.6 cm in size taking: Reported on 12/3/2020 ) 36 capsule 1      HISTORICAL INFORMATION   Past Medical History:   Diagnosis Date   • Abdominal pain Dec.  2019    Pain is under last left rib area and I only felt the pain wh   • Atypical ductal hyperplasia of left breast Colon Cancer Paternal Grandmother         dx early 62s   • Other (DVT) Paternal Grandmother    • Cancer Paternal Grandfather         Lung CA - Smoker dx age 67's   • DCIS Self    • Breast Cancer Self 49   • No Known Problems Son    • No Known Problems Son Pupils are equal, round, and reactive to light. Conjunctivae and EOM are normal. Right eye exhibits no discharge. Left eye exhibits no discharge. Neck: Normal range of motion. Neck supple. No JVD present. No thyromegaly present.    Cardiovascular: Normal was seen today for physical.    Diagnoses and all orders for this visit:    Routine physical examination    Wellness examination    Hyperlipidemia, unspecified hyperlipidemia type    Breast neoplasm, Tis (DCIS), left    Essential hypertension  -     CBC WI discussed. Repeat labs in about 4 to 6 months. Mass of left side of neck     Reviewed the CT scans with the patient. She has 2 lipomas in the area. Advised to follow-up with Alicia Ruiz for an evaluation–[phone] for an appointment.

## 2020-12-03 NOTE — ASSESSMENT & PLAN NOTE
Blood pressure 138/81, pulse 80, temperature 97.3 °F (36.3 °C), temperature source Tympanic, resp. rate 20, height 5' 6\" (1.676 m), weight 228 lb 9.6 oz (103.7 kg), not currently breastfeeding.   Blood pressures are stable, continue on amlodipine 5 mg clive
LDL levels remain elevated. Patient does not want to start on statins at this time. There is family history of heart disease. She has had a normal treadmill stress test as well as CT calcium scoring study showed no significant plaque.   Strict diet contr
Normal exam.  Labs as ordered. Skin check normal.  No significant abnormal nevi. Breast exam completed–no palpable abnormalities, discharge from the nipples or axillary adenopathy. Mammogram due on 02/13/2021    No cervical or inguinal lymphadenopathy.
Patient is status post lumpectomy on September 11, 2019 for a 9 mm grade 3 DCIS, 20% ER, 15% IL. Had a complete excision because of close margins on October 19 and no additional lesions were noted.     She declined both endocrine therapy as well as radiati
Reviewed the CT scans with the patient. She has 2 lipomas in the area. Advised to follow-up with Tiffany Pruitt for an evaluation–[phone] for an appointment.
regular

## 2020-12-09 ENCOUNTER — OFFICE VISIT (OUTPATIENT)
Dept: SURGERY | Facility: CLINIC | Age: 55
End: 2020-12-09
Payer: COMMERCIAL

## 2020-12-09 VITALS — HEIGHT: 66 IN | BODY MASS INDEX: 36.64 KG/M2 | WEIGHT: 228 LBS

## 2020-12-09 DIAGNOSIS — R22.1 MASS IN NECK: Primary | ICD-10-CM

## 2020-12-09 PROCEDURE — 99243 OFF/OP CNSLTJ NEW/EST LOW 30: CPT | Performed by: SURGERY

## 2020-12-09 PROCEDURE — 3008F BODY MASS INDEX DOCD: CPT | Performed by: SURGERY

## 2020-12-09 NOTE — H&P
Chief complaint: Patient presents with:  Lump: Referred by Dr. Esparza Shoulder for lipoma, on posterior neck which has been present for 20 years.        HPI: Shola Alcala presents for consult, referred by Dr. Lucy Ballesteros for eval of a soft tissue mass of the L posterior US guided bx - ADH       Allergies:    Other                   SWELLING, TONGUE SWELLING,                            Tightness in Throat    Comment:MSG reaction  Alcohol                 RASH, NAUSEA ONLY  Cat Hair Extract        Runny nose  Pollen Extract use: No       Family history:  Family History   Problem Relation Age of Onset   • Heart Disorder Father         palpitations   • Cancer Father 47        Leukemia    • Prostate Cancer Father 66   • Pancreatic Cancer Father 80        stage IV    • Diabetes M spine. It is firm, slightly lobulated,  no thyromegaly, no JVD. Respiratory: normal to inspection, lungs are clear to auscultation bilaterally, normal respiratory effort, no wheezing. Cardiovascular: regular rate.   Abdomen: soft, non-tender, non-distende

## 2020-12-11 RX ORDER — METFORMIN HYDROCHLORIDE 750 MG/1
750 TABLET, EXTENDED RELEASE ORAL DAILY
Qty: 90 TABLET | Refills: 1 | Status: SHIPPED | OUTPATIENT
Start: 2020-12-11 | End: 2021-06-07

## 2020-12-11 RX ORDER — AMLODIPINE BESYLATE 5 MG/1
5 TABLET ORAL DAILY
Qty: 90 TABLET | Refills: 1 | Status: SHIPPED | OUTPATIENT
Start: 2020-12-11 | End: 2021-06-07

## 2020-12-17 ENCOUNTER — LAB ENCOUNTER (OUTPATIENT)
Dept: LAB | Age: 55
End: 2020-12-17
Attending: NURSE PRACTITIONER
Payer: COMMERCIAL

## 2020-12-17 ENCOUNTER — HOSPITAL ENCOUNTER (OUTPATIENT)
Dept: ULTRASOUND IMAGING | Age: 55
Discharge: HOME | End: 2020-12-17
Attending: INTERNAL MEDICINE
Payer: COMMERCIAL

## 2020-12-17 DIAGNOSIS — R10.12 LEFT UPPER QUADRANT PAIN: ICD-10-CM

## 2020-12-17 DIAGNOSIS — K21.9 GASTROESOPHAGEAL REFLUX DISEASE WITHOUT ESOPHAGITIS: ICD-10-CM

## 2020-12-17 DIAGNOSIS — R10.12 ABDOMINAL PAIN, LEFT UPPER QUADRANT: ICD-10-CM

## 2020-12-17 DIAGNOSIS — K29.30 CHRONIC SUPERFICIAL GASTRITIS WITHOUT BLEEDING: ICD-10-CM

## 2020-12-17 PROCEDURE — 76700 US EXAM ABDOM COMPLETE: CPT | Performed by: INTERNAL MEDICINE

## 2020-12-17 PROCEDURE — 82784 ASSAY IGA/IGD/IGG/IGM EACH: CPT

## 2020-12-17 PROCEDURE — 83516 IMMUNOASSAY NONANTIBODY: CPT

## 2020-12-17 PROCEDURE — 36415 COLL VENOUS BLD VENIPUNCTURE: CPT

## 2021-02-10 ENCOUNTER — HOSPITAL ENCOUNTER (OUTPATIENT)
Dept: MAMMOGRAPHY | Facility: HOSPITAL | Age: 56
Discharge: HOME | End: 2021-02-10
Attending: SURGERY
Payer: COMMERCIAL

## 2021-02-10 DIAGNOSIS — D05.12 BREAST NEOPLASM, TIS (DCIS), LEFT: ICD-10-CM

## 2021-02-10 PROCEDURE — 77061 BREAST TOMOSYNTHESIS UNI: CPT | Performed by: SURGERY

## 2021-02-10 PROCEDURE — 77065 DX MAMMO INCL CAD UNI: CPT | Performed by: SURGERY

## 2021-02-15 ENCOUNTER — TELEPHONE (OUTPATIENT)
Dept: OBGYN CLINIC | Facility: CLINIC | Age: 56
End: 2021-02-15

## 2021-02-15 DIAGNOSIS — N95.0 POSTMENOPAUSAL BLEEDING: Primary | ICD-10-CM

## 2021-02-15 NOTE — TELEPHONE ENCOUNTER
Per Dr. Lakisha Blackwood, pt advised to have pelvic ultrasound done. Order placed and routed to provider for signature.

## 2021-02-15 NOTE — PROGRESS NOTES
Breast Surgery Surveillance Visit    Diagnosis: Left breast atypical ductal hyperplasia upgraded to DCIS status post lumpectomy on September 9, 2019 no status post reexcision of inferior margin on October 29, 2019.     Stage: Cancer Staging  Breast neoplasm evaluation and recommendations for further therapy.         Past Medical History:   Diagnosis Date   • Abdominal pain Dec.  2019    Pain is under last left rib area and I only felt the pain wh   • Atypical ductal hyperplasia of left breast     Surgery sched pregnancy. Medications:    No outpatient medications have been marked as taking for the 2/19/21 encounter (Appointment) with Deborah Abbott MD.      Allergies:       Other                   SWELLING, TONGUE SWELLING,                            Tightn trauma. Respiratory:  The patient denies chronic cough, phlegm, hemoptysis, pleurisy/chest pain, pneumonia, asthma, wheezing, difficulty in breathing with exertion, emphysema, chronic bronchitis, shortness of breath or abnormal sound when breathing. cold intolerance, thyroid disease. Allergic/Immunologic:  There is no history of hives, hay fever, angioedema or anaphylaxis.     /90 (BP Location: Right arm, Patient Position: Sitting, Cuff Size: large)   Pulse 74   Resp 16     Physical Examinati may be warranted in the future if she identifies any limitations or restrictions. She was encouraged to contact the office with any questions or concerns prior to her next scheduled appointment.      This encounter lasted a total of 25 minutes, more than 50

## 2021-02-15 NOTE — TELEPHONE ENCOUNTER
Pt advised that it is hard to tell why she may be bleeding two years. Later. Pt state she felt breast tenderness and hot flashes improved prior to starting menses. Pt advised to keep current appointment and monitor bleeding.    Pt would like to see if

## 2021-02-19 ENCOUNTER — OFFICE VISIT (OUTPATIENT)
Dept: SURGERY | Facility: CLINIC | Age: 56
End: 2021-02-19
Payer: COMMERCIAL

## 2021-02-19 VITALS — SYSTOLIC BLOOD PRESSURE: 147 MMHG | RESPIRATION RATE: 16 BRPM | DIASTOLIC BLOOD PRESSURE: 90 MMHG | HEART RATE: 74 BPM

## 2021-02-19 DIAGNOSIS — D05.12 NEOPLASM OF LEFT BREAST, PRIMARY TUMOR STAGING CATEGORY TIS: DUCTAL CARCINOMA IN SITU (DCIS): Primary | ICD-10-CM

## 2021-02-19 PROCEDURE — 3080F DIAST BP >= 90 MM HG: CPT | Performed by: SURGERY

## 2021-02-19 PROCEDURE — 3077F SYST BP >= 140 MM HG: CPT | Performed by: SURGERY

## 2021-02-19 PROCEDURE — 99214 OFFICE O/P EST MOD 30 MIN: CPT | Performed by: SURGERY

## 2021-03-02 ENCOUNTER — HOSPITAL ENCOUNTER (OUTPATIENT)
Dept: ULTRASOUND IMAGING | Age: 56
Discharge: HOME | End: 2021-03-02
Attending: INTERNAL MEDICINE
Payer: COMMERCIAL

## 2021-03-02 ENCOUNTER — LAB ENCOUNTER (OUTPATIENT)
Dept: LAB | Age: 56
End: 2021-03-02
Attending: INTERNAL MEDICINE
Payer: COMMERCIAL

## 2021-03-02 DIAGNOSIS — I10 ESSENTIAL HYPERTENSION: ICD-10-CM

## 2021-03-02 DIAGNOSIS — E04.1 THYROID NODULE: ICD-10-CM

## 2021-03-02 LAB — TSI SER-ACNC: 0.94 MIU/ML (ref 0.36–3.74)

## 2021-03-02 PROCEDURE — 36415 COLL VENOUS BLD VENIPUNCTURE: CPT

## 2021-03-02 PROCEDURE — 76536 US EXAM OF HEAD AND NECK: CPT | Performed by: INTERNAL MEDICINE

## 2021-03-02 PROCEDURE — 84443 ASSAY THYROID STIM HORMONE: CPT

## 2021-03-04 ENCOUNTER — HOSPITAL ENCOUNTER (OUTPATIENT)
Dept: ULTRASOUND IMAGING | Age: 56
Discharge: HOME | End: 2021-03-04
Attending: OBSTETRICS & GYNECOLOGY
Payer: COMMERCIAL

## 2021-03-04 DIAGNOSIS — N95.0 POSTMENOPAUSAL BLEEDING: ICD-10-CM

## 2021-03-04 PROCEDURE — 76856 US EXAM PELVIC COMPLETE: CPT | Performed by: OBSTETRICS & GYNECOLOGY

## 2021-03-04 PROCEDURE — 76830 TRANSVAGINAL US NON-OB: CPT | Performed by: OBSTETRICS & GYNECOLOGY

## 2021-03-09 ENCOUNTER — OFFICE VISIT (OUTPATIENT)
Dept: ENDOCRINOLOGY CLINIC | Facility: CLINIC | Age: 56
End: 2021-03-09
Payer: COMMERCIAL

## 2021-03-09 VITALS
WEIGHT: 233 LBS | BODY MASS INDEX: 38 KG/M2 | DIASTOLIC BLOOD PRESSURE: 83 MMHG | HEART RATE: 72 BPM | SYSTOLIC BLOOD PRESSURE: 134 MMHG

## 2021-03-09 DIAGNOSIS — E04.1 THYROID NODULE: Primary | ICD-10-CM

## 2021-03-09 PROCEDURE — 99213 OFFICE O/P EST LOW 20 MIN: CPT | Performed by: INTERNAL MEDICINE

## 2021-03-09 PROCEDURE — 3075F SYST BP GE 130 - 139MM HG: CPT | Performed by: INTERNAL MEDICINE

## 2021-03-09 PROCEDURE — 3079F DIAST BP 80-89 MM HG: CPT | Performed by: INTERNAL MEDICINE

## 2021-03-09 NOTE — PROGRESS NOTES
Return Office Visit     CHIEF COMPLAINT:  Patient presents with:   Follow - Up: Pt is present to follow up with the doctor        HISTORY OF PRESENT ILLNESS:  Rashawn Bernardo is a 48year old female who presents for follow up for of thyroid nodu reviewed. See past family history marked as reviewed. See past social history marked as reviewed.     ASSESSMENTS:     REVIEW OF SYSTEMS:  Constitutional: Negative for: weight change, fever, fatigue, cold/heat intolerance  Eyes: Negative for:  Visual rivera calcifications within the inferior aspect of the left lobe measuring 2.4 x 1.4 x 1.7 cm and measured 2.4 x 1.5 x 1.8 cm on prior exam.       Hypoechoic nodule within the mid aspect of the left lobe measuring 0.9 x 0.7 x 1.0 cm and measured 1.0 x 0.7 x 1.1

## 2021-03-10 ENCOUNTER — TELEPHONE (OUTPATIENT)
Dept: OBGYN CLINIC | Facility: CLINIC | Age: 56
End: 2021-03-10

## 2021-03-10 NOTE — TELEPHONE ENCOUNTER
Spoke to Marcelino from [facility] PPO Eligibility line. Pt has coverage as of 1/1/21 per Marcelino. No ref. # for this call just time and date.

## 2021-03-11 ENCOUNTER — OFFICE VISIT (OUTPATIENT)
Dept: OBGYN CLINIC | Facility: CLINIC | Age: 56
End: 2021-03-11
Payer: COMMERCIAL

## 2021-03-11 VITALS
RESPIRATION RATE: 18 BRPM | SYSTOLIC BLOOD PRESSURE: 122 MMHG | HEART RATE: 67 BPM | WEIGHT: 235 LBS | DIASTOLIC BLOOD PRESSURE: 82 MMHG | BODY MASS INDEX: 37.77 KG/M2 | HEIGHT: 66 IN

## 2021-03-11 DIAGNOSIS — Z01.419 ENCOUNTER FOR WELL WOMAN EXAM WITH ROUTINE GYNECOLOGICAL EXAM: Primary | ICD-10-CM

## 2021-03-11 DIAGNOSIS — Z01.812 PRE-PROCEDURAL LABORATORY EXAMINATION: ICD-10-CM

## 2021-03-11 DIAGNOSIS — Z12.4 CERVICAL CANCER SCREENING: ICD-10-CM

## 2021-03-11 DIAGNOSIS — N95.0 PMB (POSTMENOPAUSAL BLEEDING): ICD-10-CM

## 2021-03-11 LAB
CONTROL LINE PRESENT WITH A CLEAR BACKGROUND (YES/NO): YES YES/NO
PREGNANCY TEST, URINE: NEGATIVE

## 2021-03-11 PROCEDURE — 58100 BIOPSY OF UTERUS LINING: CPT | Performed by: OBSTETRICS & GYNECOLOGY

## 2021-03-11 PROCEDURE — 88305 TISSUE EXAM BY PATHOLOGIST: CPT | Performed by: OBSTETRICS & GYNECOLOGY

## 2021-03-11 PROCEDURE — 3079F DIAST BP 80-89 MM HG: CPT | Performed by: OBSTETRICS & GYNECOLOGY

## 2021-03-11 PROCEDURE — 81025 URINE PREGNANCY TEST: CPT | Performed by: OBSTETRICS & GYNECOLOGY

## 2021-03-11 PROCEDURE — 87624 HPV HI-RISK TYP POOLED RSLT: CPT | Performed by: OBSTETRICS & GYNECOLOGY

## 2021-03-11 PROCEDURE — 99396 PREV VISIT EST AGE 40-64: CPT | Performed by: OBSTETRICS & GYNECOLOGY

## 2021-03-11 PROCEDURE — 3008F BODY MASS INDEX DOCD: CPT | Performed by: OBSTETRICS & GYNECOLOGY

## 2021-03-11 PROCEDURE — 88175 CYTOPATH C/V AUTO FLUID REDO: CPT | Performed by: OBSTETRICS & GYNECOLOGY

## 2021-03-11 PROCEDURE — 3074F SYST BP LT 130 MM HG: CPT | Performed by: OBSTETRICS & GYNECOLOGY

## 2021-03-11 NOTE — PROGRESS NOTES
Cesia Blankenship is a 48year old female  No LMP recorded (lmp unknown). Patient is postmenopausal. Patient presents with: Well Adult: - had a period   .   Patient had last period in 2018, started having breast tenderness and bloa age 29   • Colonoscopy      age 44   • Lumpectomy left Left 09/09/2019    DCIS;ADH   • Aayush localization wire 1 site left (cpt=[id number])  08/2019    reexcision for clean margins 10/2019   • Needle biopsy left  08/2019    US guided bx - ADH       SOCIAL HIST Communication with Friends and Family:       Frequency of Social Gatherings with Friends and Family:       Attends Yazdanism Services:       Active Member of Clubs or Organizations:       Attends Club or Organization Meetings:       Marital Status:   Intim Justin York (Patient not taking: Reported on 2/19/2021 ), Disp: 36 capsule, Rfl: 1  •  Magnesium 100 MG Oral Tab, Take by mouth. (Patient not taking: Reported on 3/11/2021 ), Disp: , Rfl:   •  Krill Oil 300 MG Oral Cap, Take 1 tablet by mouth daily. masses  Skin/Hair: no unusual rashes or bruises  Extremities: no edema, no cyanosis  Psychiatric:  Oriented to time, place, person and situation.  Appropriate mood and affect    Pelvic Exam:  External Genitalia: normal appearance, hair distribution, and no examination  -     URINE PREGNANCY TEST

## 2021-03-12 LAB — HPV I/H RISK 1 DNA SPEC QL NAA+PROBE: NEGATIVE

## 2021-06-07 RX ORDER — METFORMIN HYDROCHLORIDE 750 MG/1
TABLET, EXTENDED RELEASE ORAL
Qty: 90 TABLET | Refills: 1 | Status: SHIPPED | OUTPATIENT
Start: 2021-06-07 | End: 2022-01-06 | Stop reason: ALTCHOICE

## 2021-06-07 RX ORDER — AMLODIPINE BESYLATE 5 MG/1
TABLET ORAL
Qty: 90 TABLET | Refills: 1 | Status: SHIPPED | OUTPATIENT
Start: 2021-06-07 | End: 2021-12-01

## 2021-07-01 ENCOUNTER — LAB ENCOUNTER (OUTPATIENT)
Dept: LAB | Age: 56
End: 2021-07-01
Attending: INTERNAL MEDICINE
Payer: COMMERCIAL

## 2021-07-01 DIAGNOSIS — I10 ESSENTIAL HYPERTENSION: ICD-10-CM

## 2021-07-01 LAB
ALBUMIN SERPL-MCNC: 4.2 G/DL (ref 3.4–5)
ALBUMIN/GLOB SERPL: 1.2 {RATIO} (ref 1–2)
ALP LIVER SERPL-CCNC: 98 U/L
ALT SERPL-CCNC: 23 U/L
ANION GAP SERPL CALC-SCNC: 7 MMOL/L (ref 0–18)
AST SERPL-CCNC: 17 U/L (ref 15–37)
BASOPHILS # BLD AUTO: 0.04 X10(3) UL (ref 0–0.2)
BASOPHILS NFR BLD AUTO: 0.6 %
BILIRUB SERPL-MCNC: 0.9 MG/DL (ref 0.1–2)
BUN BLD-MCNC: 12 MG/DL (ref 7–18)
BUN/CREAT SERPL: 14 (ref 10–20)
CALCIUM BLD-MCNC: 9 MG/DL (ref 8.5–10.1)
CHLORIDE SERPL-SCNC: 106 MMOL/L (ref 98–112)
CHOLEST SMN-MCNC: 211 MG/DL (ref ?–200)
CO2 SERPL-SCNC: 26 MMOL/L (ref 21–32)
CREAT BLD-MCNC: 0.86 MG/DL
DEPRECATED RDW RBC AUTO: 40.9 FL (ref 35.1–46.3)
EOSINOPHIL # BLD AUTO: 0.12 X10(3) UL (ref 0–0.7)
EOSINOPHIL NFR BLD AUTO: 1.8 %
ERYTHROCYTE [DISTWIDTH] IN BLOOD BY AUTOMATED COUNT: 12.4 % (ref 11–15)
GLOBULIN PLAS-MCNC: 3.6 G/DL (ref 2.8–4.4)
GLUCOSE BLD-MCNC: 99 MG/DL (ref 70–99)
HCT VFR BLD AUTO: 45.2 %
HDLC SERPL-MCNC: 52 MG/DL (ref 40–59)
HGB BLD-MCNC: 14.7 G/DL
IMM GRANULOCYTES # BLD AUTO: 0.02 X10(3) UL (ref 0–1)
IMM GRANULOCYTES NFR BLD: 0.3 %
LDLC SERPL CALC-MCNC: 138 MG/DL (ref ?–100)
LYMPHOCYTES # BLD AUTO: 2.87 X10(3) UL (ref 1–4)
LYMPHOCYTES NFR BLD AUTO: 43.3 %
M PROTEIN MFR SERPL ELPH: 7.8 G/DL (ref 6.4–8.2)
MCH RBC QN AUTO: 29.6 PG (ref 26–34)
MCHC RBC AUTO-ENTMCNC: 32.5 G/DL (ref 31–37)
MCV RBC AUTO: 91.1 FL
MONOCYTES # BLD AUTO: 0.49 X10(3) UL (ref 0.1–1)
MONOCYTES NFR BLD AUTO: 7.4 %
NEUTROPHILS # BLD AUTO: 3.09 X10 (3) UL (ref 1.5–7.7)
NEUTROPHILS # BLD AUTO: 3.09 X10(3) UL (ref 1.5–7.7)
NEUTROPHILS NFR BLD AUTO: 46.6 %
NONHDLC SERPL-MCNC: 159 MG/DL (ref ?–130)
OSMOLALITY SERPL CALC.SUM OF ELEC: 288 MOSM/KG (ref 275–295)
PATIENT FASTING Y/N/NP: YES
PATIENT FASTING Y/N/NP: YES
PLATELET # BLD AUTO: 350 10(3)UL (ref 150–450)
POTASSIUM SERPL-SCNC: 3.7 MMOL/L (ref 3.5–5.1)
RBC # BLD AUTO: 4.96 X10(6)UL
SODIUM SERPL-SCNC: 139 MMOL/L (ref 136–145)
TRIGL SERPL-MCNC: 115 MG/DL (ref 30–149)
VLDLC SERPL CALC-MCNC: 21 MG/DL (ref 0–30)
WBC # BLD AUTO: 6.6 X10(3) UL (ref 4–11)

## 2021-07-01 PROCEDURE — 36415 COLL VENOUS BLD VENIPUNCTURE: CPT

## 2021-07-01 PROCEDURE — 80053 COMPREHEN METABOLIC PANEL: CPT

## 2021-07-01 PROCEDURE — 80061 LIPID PANEL: CPT

## 2021-07-01 PROCEDURE — 85025 COMPLETE CBC W/AUTO DIFF WBC: CPT

## 2021-07-06 ENCOUNTER — OFFICE VISIT (OUTPATIENT)
Dept: INTERNAL MEDICINE CLINIC | Facility: CLINIC | Age: 56
End: 2021-07-06
Payer: COMMERCIAL

## 2021-07-06 VITALS
HEART RATE: 61 BPM | DIASTOLIC BLOOD PRESSURE: 84 MMHG | WEIGHT: 233.13 LBS | BODY MASS INDEX: 37.47 KG/M2 | SYSTOLIC BLOOD PRESSURE: 127 MMHG | HEIGHT: 66 IN

## 2021-07-06 DIAGNOSIS — E66.9 OBESITY (BMI 35.0-39.9 WITHOUT COMORBIDITY): ICD-10-CM

## 2021-07-06 DIAGNOSIS — D05.12 BREAST NEOPLASM, TIS (DCIS), LEFT: Primary | ICD-10-CM

## 2021-07-06 DIAGNOSIS — E78.5 HYPERLIPIDEMIA, UNSPECIFIED HYPERLIPIDEMIA TYPE: ICD-10-CM

## 2021-07-06 PROCEDURE — 3008F BODY MASS INDEX DOCD: CPT | Performed by: NURSE PRACTITIONER

## 2021-07-06 PROCEDURE — 3079F DIAST BP 80-89 MM HG: CPT | Performed by: NURSE PRACTITIONER

## 2021-07-06 PROCEDURE — 3074F SYST BP LT 130 MM HG: CPT | Performed by: NURSE PRACTITIONER

## 2021-07-06 PROCEDURE — 99214 OFFICE O/P EST MOD 30 MIN: CPT | Performed by: NURSE PRACTITIONER

## 2021-07-06 NOTE — PROGRESS NOTES
HPI:    Patient ID: Jairon Phan is a 55year old female.     HPI Follow up on lab  Hyperlipidemia  Cholesterol, Total   <200 mg/dL 211High     Comment: Desirable  <200 mg/dL   Borderline  200-239 mg/dL   High      >=240 mg/dL      HDL Choles 10,000 steps per day  3) Plant protein diet given to patient    Hyperlipidemia  LDL levels remain elevated 138. Reviewed Dr. South Gtz message with patient. She has not gone on mychart. Patient does not want to start on statins at this time.   There is family file      Number of children: 2      Years of education: Not on file      Highest education level: Not on file    Occupational History      Occupation: self-employed, works as massage therapist and  custom     Tobacco Use      Smoking status: (MULTIVITAMIN ADULT OR) Take 1 tablet by mouth daily. • Cholecalciferol (VITAMIN D-3 OR) Take 1 tablet by mouth daily. 1000 UNITS      • Probiotic Product (PROBIOTIC DAILY OR) Take 1 capsule by mouth daily.      • Turmeric Does not apply Powder by Does Coordination: Coordination normal.      Gait: Gait normal.   Psychiatric:         Mood and Affect: Mood normal.         Behavior: Behavior normal.         Thought Content:  Thought content normal.         Judgment: Judgment normal.       /84 (BP calcium scoring study showed no significant plaque. Strict diet control, daily exercise for about 20 minutes to improve exercise tolerance discussed. Repeat labs in 6 months. Encouraged statin therapy. No follow-ups on file.     No orders

## 2021-07-06 NOTE — ASSESSMENT & PLAN NOTE
LDL levels remain elevated 138. Reviewed Dr. Jony Fiore message with patient. She has not gone on mychart. Patient does not want to start on statins at this time. There is family history of heart disease.   She has had a normal treadmill stress test as well a

## 2021-07-06 NOTE — ASSESSMENT & PLAN NOTE
Has been losing weight through diet control, use of metformin, increasing exercise. Advised to continue strict diet control and cut back on starches even more if possible. Drink plenty of fluids and increase vegetables in the diet. Lipid panel reviewed.

## 2021-07-06 NOTE — ASSESSMENT & PLAN NOTE
Patient is status post lumpectomy on September 11, 2019 for a 9 mm grade 3 DCIS, 20% ER, 15% MO.   Had a complete excision because of close margins on October 19 and no additional lesions were noted.     She declined both endocrine therapy as well as radiat

## 2021-08-13 ENCOUNTER — HOSPITAL ENCOUNTER (OUTPATIENT)
Dept: MAMMOGRAPHY | Age: 56
Discharge: HOME | End: 2021-08-13
Attending: SURGERY
Payer: COMMERCIAL

## 2021-08-13 DIAGNOSIS — D05.12 NEOPLASM OF LEFT BREAST, PRIMARY TUMOR STAGING CATEGORY TIS: DUCTAL CARCINOMA IN SITU (DCIS): ICD-10-CM

## 2021-08-13 PROCEDURE — 77066 DX MAMMO INCL CAD BI: CPT | Performed by: SURGERY

## 2021-08-13 PROCEDURE — 77062 BREAST TOMOSYNTHESIS BI: CPT | Performed by: SURGERY

## 2021-08-27 ENCOUNTER — NURSE TRIAGE (OUTPATIENT)
Dept: INTERNAL MEDICINE CLINIC | Facility: CLINIC | Age: 56
End: 2021-08-27

## 2021-08-27 ENCOUNTER — TELEMEDICINE (OUTPATIENT)
Dept: INTERNAL MEDICINE CLINIC | Facility: CLINIC | Age: 56
End: 2021-08-27

## 2021-08-27 DIAGNOSIS — J01.10 ACUTE NON-RECURRENT FRONTAL SINUSITIS: Primary | ICD-10-CM

## 2021-08-27 PROCEDURE — 99213 OFFICE O/P EST LOW 20 MIN: CPT | Performed by: INTERNAL MEDICINE

## 2021-08-27 RX ORDER — AMOXICILLIN AND CLAVULANATE POTASSIUM 875; 125 MG/1; MG/1
1 TABLET, FILM COATED ORAL 2 TIMES DAILY
Qty: 14 TABLET | Refills: 0 | Status: SHIPPED | OUTPATIENT
Start: 2021-08-27 | End: 2022-01-06 | Stop reason: ALTCHOICE

## 2021-08-27 RX ORDER — MONTELUKAST SODIUM 10 MG/1
10 TABLET ORAL DAILY
Qty: 30 TABLET | Refills: 0 | Status: SHIPPED | OUTPATIENT
Start: 2021-08-27 | End: 2021-09-21

## 2021-08-27 NOTE — TELEPHONE ENCOUNTER
Action Requested: Summary for Provider     []  Critical Lab, Recommendations Needed  [] Need Additional Advice  []   FYI    []   Need Orders  [] Need Medications Sent to Pharmacy  []  Other     SUMMARY: Patient states for past 8 days she has been having si

## 2021-08-30 ENCOUNTER — TELEPHONE (OUTPATIENT)
Dept: INTERNAL MEDICINE CLINIC | Facility: CLINIC | Age: 56
End: 2021-08-30

## 2021-08-30 NOTE — TELEPHONE ENCOUNTER
Patient states she had a bad connection at video visit and has not picked up medications prescribed 8/27/21. Patient asked to go over instructions and had questions about medications.       Patient states she is having phlegm buildup in her chest, clear d

## 2021-09-07 ENCOUNTER — OFFICE VISIT (OUTPATIENT)
Dept: SURGERY | Facility: CLINIC | Age: 56
End: 2021-09-07
Payer: COMMERCIAL

## 2021-09-07 VITALS
OXYGEN SATURATION: 96 % | RESPIRATION RATE: 16 BRPM | HEIGHT: 66 IN | WEIGHT: 233 LBS | BODY MASS INDEX: 37.45 KG/M2 | HEART RATE: 81 BPM | SYSTOLIC BLOOD PRESSURE: 123 MMHG | DIASTOLIC BLOOD PRESSURE: 77 MMHG

## 2021-09-07 DIAGNOSIS — D05.12 BREAST NEOPLASM, TIS (DCIS), LEFT: Primary | ICD-10-CM

## 2021-09-07 PROCEDURE — 3078F DIAST BP <80 MM HG: CPT | Performed by: SURGERY

## 2021-09-07 PROCEDURE — 3008F BODY MASS INDEX DOCD: CPT | Performed by: SURGERY

## 2021-09-07 PROCEDURE — 3074F SYST BP LT 130 MM HG: CPT | Performed by: SURGERY

## 2021-09-07 PROCEDURE — 99214 OFFICE O/P EST MOD 30 MIN: CPT | Performed by: SURGERY

## 2021-09-21 RX ORDER — MONTELUKAST SODIUM 10 MG/1
10 TABLET ORAL DAILY
Qty: 90 TABLET | Refills: 1 | Status: SHIPPED | OUTPATIENT
Start: 2021-09-21 | End: 2022-01-06 | Stop reason: ALTCHOICE

## 2021-09-21 NOTE — TELEPHONE ENCOUNTER
Please advise if you want patient to continue with this medication. Script changed to #90 and 1refill. Refill passed per [facility] protocol.   Requested Prescriptions   Pending Prescriptions Disp Refills    MONTELUKAST 10 MG Oral Tab [Pharmacy

## 2021-09-30 NOTE — PROGRESS NOTES
Breast Surgery Surveillance Visit    Diagnosis: Left breast atypical ductal hyperplasia upgraded to DCIS status post lumpectomy on September 9, 2019 no status post reexcision of inferior margin on October 29, 2019.     Stage: Cancer Staging  Breast neoplasm evaluation and recommendations for further therapy.         Past Medical History:   Diagnosis Date   • Abdominal pain Dec.  2019    Pain is under last left rib area and I only felt the pain wh   • Atypical ductal hyperplasia of left breast     Surgery sched Tab, Take 1 tablet by mouth 2 (two) times daily. , Disp: 14 tablet, Rfl: 0  METFORMIN HCL  MG Oral Tablet 24 Hr, TAKE 1 TABLET BY MOUTH EVERY DAY, Disp: 90 tablet, Rfl: 1  AMLODIPINE BESYLATE 5 MG Oral Tab, TAKE 1 TABLET BY MOUTH EVERY DAY, Disp: 90 t Smoking status: Never Smoker   Smokeless tobacco: Never Used   The patient is . She has 2 children. She is employed full-time.     Review of Systems:  General:   The patient denies, fever, chills, +night sweats, fatigue, generalized weaknes muscle aches/pain, joint pain, stiff joints, neck pain, back pain or bone pain.     Neuropsychiatric:  There is no history of migraines or severe headaches, seizure/epilepsy, speech problems, coordination problems, trembling/tremors, fainting/black outs, di lumpectomy. Discussion and Plan:  I had a discussion with the Patient regarding her breast exam. On exam today I found her to be healing well since surgery with no signs of new or recurrent disease.   I personally reviewed her recent imaging which is con

## 2021-11-30 ENCOUNTER — TELEPHONE (OUTPATIENT)
Dept: INTERNAL MEDICINE CLINIC | Facility: CLINIC | Age: 56
End: 2021-11-30

## 2021-11-30 DIAGNOSIS — Z00.00 ROUTINE PHYSICAL EXAMINATION: Primary | ICD-10-CM

## 2021-11-30 NOTE — TELEPHONE ENCOUNTER
Patient is scheduled for a physical on 2/23, but is requesting to get blood work prior to appt. Thank you.

## 2021-12-01 RX ORDER — AMLODIPINE BESYLATE 5 MG/1
5 TABLET ORAL DAILY
Qty: 90 TABLET | Refills: 1 | Status: SHIPPED | OUTPATIENT
Start: 2021-12-01 | End: 2023-01-25

## 2021-12-01 NOTE — TELEPHONE ENCOUNTER
Refill passed per [facility] protocol. Requested Prescriptions   Pending Prescriptions Disp Refills    AMLODIPINE 5 MG Oral Tab [Pharmacy Med Name: AMLODIPINE BESYLATE 5 MG TAB] 90 tablet 1     Sig: TAKE 1 TABLET BY MOUTH EVERY DAY        Hypertensive Medications Protocol Passed - 12/1/2021 12:47 PM        Passed - CMP or BMP in past 12 months        Passed - Appointment in past 6 or next 3 months        Passed - GFR Non- > 50     Lab Results   Component Value Date    GFRNAA 76 07/01/2021                        Recent Outpatient Visits              2 months ago Breast neoplasm, Tis (DCIS), left    [facility] Scott Aviles MD    Office Visit    3 months ago Acute non-recurrent frontal sinusitis    [facility], MD Cornelio    Telemedicine    4 months ago Breast neoplasm, Tis (DCIS), left    [facility], [address], [facility] LYLE Elizabeth    Office Visit    8 months ago Encounter for well woman exam with routine gynecological exam    [address], DO    Office Visit    8 months ago Thyroid nodule    [facility] Endocrinology Arin Black MD    Office Visit            Future Appointments         Provider Department Appt Notes    In 2 months [facility] [address] Orders in epic from Dr. Phoebe Guerrero 11/30/21.  TB    In 2 months Phoebe Guerrero MD [facility], [address], [facility] [facility]     In 3 months [address] epic order by dr. Martel [facility] dated 03/09/21    In 3 months Arin Black, [address] Endocrinology 1 yr f/u    In 3 months [address], [facility] [address]    In 8 months PF [facility] [address] [facility]     In 10 months MD [facility] yearly

## 2021-12-20 ENCOUNTER — TELEPHONE (OUTPATIENT)
Dept: INTERNAL MEDICINE CLINIC | Facility: CLINIC | Age: 56
End: 2021-12-20

## 2021-12-20 DIAGNOSIS — Z20.822 CLOSE EXPOSURE TO COVID-19 VIRUS: Primary | ICD-10-CM

## 2021-12-20 NOTE — TELEPHONE ENCOUNTER
Called Pt. States she already scheduled the covid test.  Received notification through [employer].   Future Appointments   Date Time Provider Herbie Burt   12/21/2021 10:30 AM Christine Petersen [facility] Yoseph Miranda

## 2021-12-20 NOTE — TELEPHONE ENCOUNTER
Pt is requesting for a COVID test by PCR. Pt states she is hosting [employer] this year, and would like to be proactive, making sure she does not have COVID. Pt is asymptomatic.  Pt mentioned, her sister recently found out she tested positive for COVID to

## 2021-12-21 ENCOUNTER — LAB ENCOUNTER (OUTPATIENT)
Dept: LAB | Age: 56
End: 2021-12-21
Attending: INTERNAL MEDICINE
Payer: COMMERCIAL

## 2021-12-21 DIAGNOSIS — Z20.822 CLOSE EXPOSURE TO COVID-19 VIRUS: ICD-10-CM

## 2022-01-06 ENCOUNTER — TELEPHONE (OUTPATIENT)
Dept: INTERNAL MEDICINE CLINIC | Facility: CLINIC | Age: 57
End: 2022-01-06

## 2022-01-06 ENCOUNTER — TELEMEDICINE (OUTPATIENT)
Dept: INTERNAL MEDICINE CLINIC | Facility: CLINIC | Age: 57
End: 2022-01-06
Payer: COMMERCIAL

## 2022-01-06 DIAGNOSIS — R09.89 CHEST CONGESTION: ICD-10-CM

## 2022-01-06 DIAGNOSIS — Z20.822 CLOSE EXPOSURE TO COVID-19 VIRUS: Primary | ICD-10-CM

## 2022-01-06 DIAGNOSIS — R05.9 COUGH: ICD-10-CM

## 2022-01-06 PROCEDURE — 99213 OFFICE O/P EST LOW 20 MIN: CPT | Performed by: INTERNAL MEDICINE

## 2022-01-06 NOTE — PROGRESS NOTES
Patient ID: Grover Sanchez is a 55year old female. Patient presents with:  Covid         HISTORY OF PRESENT ILLNESS:   Patient presents for above. This visit is conducted using Telemedicine with live, interactive video and audio.   Visit con life and it happens sometimes   • Tinnitus, right    • Visual impairment     GLASSES   • Wears glasses 7 grade    I am near sighted       Past Surgical History:   Procedure Laterality Date   • COLONOSCOPY      age 26 d/t blood in stool, x1 polyp   • COLONO drinks      Drug use: No      Sexual activity: Not on file    Other Topics      Concerns:        Caffeine Concern: Yes        Exercise: Yes          2 d/wk        Seat Belt: Yes        Special Diet: Not Asked        Stress Concern: Not Asked        Weight Hiral Ann today, 01/06/22, which was completed using two-way, real-time interactive audio and video communication.  This has been done in good christophe to provide continuity of care in the best interest of the provider-patient relationship, d

## 2022-01-06 NOTE — PATIENT INSTRUCTIONS
Coronavirus Disease 2019 (COVID-19)     [facility] is committed to the safety and well-being of our patients, members, employees, and communities.  As concerns arise about the new strain of coronavirus that causes COVID-19, [facility] exposure  • After day 7 from date of last exposure with a negative test result (test must occur on day 5 or later)  After stopping quarantine, you should  • Watch for symptoms until 14 days after exposure.   • If you have symptoms, immediately self-isolate Care     If you are awaiting test results or are confirmed positive for COVID -19, and your symptoms worsen at home with symptoms such as: extreme weakness, difficult breathing, or unrelenting fevers greater than 100.4 degrees Fahrenheit, you should contac Follow-up  If you are diagnosed with COVID, refrain from exercise until approved by your primary care provider. Please call your primary care provider within 2 days of your discharge to arrange for a telehealth follow-up.  CDC does not recommend repeat test Control & Prevention (CDC)  10 things you can do to manage your health at home, Lobo.nl. pdf  [email or web address] Retrieved March 17, 2021, from https://health.[email or web address]/coronavirus/covid-19-information/covid-19-long-haulers. html  Long-term effects of covid-19. (n.d.).  Retrieved May 11, 2021, from [email or web address]

## 2022-01-06 NOTE — TELEPHONE ENCOUNTER
Action Requested: Summary for Provider     []  Critical Lab, Recommendations Needed  [] Need Additional Advice  []   FYI    []   Need Orders  [] Need Medications Sent to Pharmacy  []  Other     SUMMARY: pt requesting ivermectin/immune therapy for \"flu lik

## 2022-01-12 ENCOUNTER — LAB ENCOUNTER (OUTPATIENT)
Dept: LAB | Age: 57
End: 2022-01-12
Attending: INTERNAL MEDICINE
Payer: COMMERCIAL

## 2022-01-12 DIAGNOSIS — R05.9 COUGH: ICD-10-CM

## 2022-01-12 DIAGNOSIS — R09.89 CHEST CONGESTION: ICD-10-CM

## 2022-01-12 DIAGNOSIS — Z20.822 CLOSE EXPOSURE TO COVID-19 VIRUS: ICD-10-CM

## 2022-01-14 LAB — SARS-COV-2 RNA RESP QL NAA+PROBE: DETECTED

## 2022-02-18 ENCOUNTER — LAB ENCOUNTER (OUTPATIENT)
Dept: LAB | Age: 57
End: 2022-02-18
Attending: INTERNAL MEDICINE
Payer: COMMERCIAL

## 2022-02-18 DIAGNOSIS — Z00.00 ROUTINE GENERAL MEDICAL EXAMINATION AT A HEALTH CARE FACILITY: Primary | ICD-10-CM

## 2022-02-18 LAB
ALBUMIN SERPL-MCNC: 4.2 G/DL (ref 3.4–5)
ALBUMIN/GLOB SERPL: 1.2 {RATIO} (ref 1–2)
ALP LIVER SERPL-CCNC: 112 U/L
ALT SERPL-CCNC: 26 U/L
ANION GAP SERPL CALC-SCNC: 6 MMOL/L (ref 0–18)
AST SERPL-CCNC: 34 U/L (ref 15–37)
BASOPHILS # BLD AUTO: 0.03 X10(3) UL (ref 0–0.2)
BASOPHILS NFR BLD AUTO: 0.5 %
BILIRUB SERPL-MCNC: 0.8 MG/DL (ref 0.1–2)
BILIRUB UR QL STRIP.AUTO: NEGATIVE
BUN BLD-MCNC: 12 MG/DL (ref 7–18)
CALCIUM BLD-MCNC: 9.5 MG/DL (ref 8.5–10.1)
CHLORIDE SERPL-SCNC: 106 MMOL/L (ref 98–112)
CHOLEST SERPL-MCNC: 200 MG/DL (ref ?–200)
CLARITY UR REFRACT.AUTO: CLEAR
CO2 SERPL-SCNC: 27 MMOL/L (ref 21–32)
CREAT BLD-MCNC: 0.84 MG/DL
EOSINOPHIL # BLD AUTO: 0.15 X10(3) UL (ref 0–0.7)
EOSINOPHIL NFR BLD AUTO: 2.4 %
ERYTHROCYTE [DISTWIDTH] IN BLOOD BY AUTOMATED COUNT: 12.8 %
FASTING PATIENT LIPID ANSWER: YES
FASTING STATUS PATIENT QL REPORTED: YES
GLOBULIN PLAS-MCNC: 3.4 G/DL (ref 2.8–4.4)
GLUCOSE BLD-MCNC: 104 MG/DL (ref 70–99)
GLUCOSE UR STRIP.AUTO-MCNC: NEGATIVE MG/DL
HCT VFR BLD AUTO: 45.2 %
HDLC SERPL-MCNC: 52 MG/DL (ref 40–59)
HGB BLD-MCNC: 14.8 G/DL
IMM GRANULOCYTES # BLD AUTO: 0.01 X10(3) UL (ref 0–1)
IMM GRANULOCYTES NFR BLD: 0.2 %
KETONES UR STRIP.AUTO-MCNC: NEGATIVE MG/DL
LDLC SERPL CALC-MCNC: 120 MG/DL (ref ?–100)
LYMPHOCYTES # BLD AUTO: 2.45 X10(3) UL (ref 1–4)
LYMPHOCYTES NFR BLD AUTO: 39.3 %
MCH RBC QN AUTO: 29.5 PG (ref 26–34)
MCHC RBC AUTO-ENTMCNC: 32.7 G/DL (ref 31–37)
MCV RBC AUTO: 90 FL
MONOCYTES # BLD AUTO: 0.42 X10(3) UL (ref 0.1–1)
MONOCYTES NFR BLD AUTO: 6.7 %
NEUTROPHILS # BLD AUTO: 3.17 X10 (3) UL (ref 1.5–7.7)
NEUTROPHILS # BLD AUTO: 3.17 X10(3) UL (ref 1.5–7.7)
NEUTROPHILS NFR BLD AUTO: 50.9 %
NITRITE UR QL STRIP.AUTO: NEGATIVE
NONHDLC SERPL-MCNC: 148 MG/DL (ref ?–130)
OSMOLALITY SERPL CALC.SUM OF ELEC: 288 MOSM/KG (ref 275–295)
PH UR STRIP.AUTO: 5 [PH] (ref 5–8)
PLATELET # BLD AUTO: 329 10(3)UL (ref 150–450)
POTASSIUM SERPL-SCNC: 4.3 MMOL/L (ref 3.5–5.1)
PROT SERPL-MCNC: 7.6 G/DL (ref 6.4–8.2)
PROT UR STRIP.AUTO-MCNC: NEGATIVE MG/DL
RBC # BLD AUTO: 5.02 X10(6)UL
RBC UR QL AUTO: NEGATIVE
SODIUM SERPL-SCNC: 139 MMOL/L (ref 136–145)
SP GR UR STRIP.AUTO: 1 (ref 1–1.03)
TRIGL SERPL-MCNC: 158 MG/DL (ref 30–149)
TSI SER-ACNC: 0.92 MIU/ML (ref 0.36–3.74)
UROBILINOGEN UR STRIP.AUTO-MCNC: <2 MG/DL
WBC # BLD AUTO: 6.2 X10(3) UL (ref 4–11)

## 2022-02-18 PROCEDURE — 85025 COMPLETE CBC W/AUTO DIFF WBC: CPT | Performed by: INTERNAL MEDICINE

## 2022-02-18 PROCEDURE — 36415 COLL VENOUS BLD VENIPUNCTURE: CPT

## 2022-02-18 PROCEDURE — 80053 COMPREHEN METABOLIC PANEL: CPT | Performed by: INTERNAL MEDICINE

## 2022-02-18 PROCEDURE — 80061 LIPID PANEL: CPT

## 2022-02-18 PROCEDURE — 81001 URINALYSIS AUTO W/SCOPE: CPT | Performed by: INTERNAL MEDICINE

## 2022-02-18 PROCEDURE — 84443 ASSAY THYROID STIM HORMONE: CPT | Performed by: INTERNAL MEDICINE

## 2022-02-22 ENCOUNTER — GENETICS ENCOUNTER (OUTPATIENT)
Dept: HEMATOLOGY/ONCOLOGY | Facility: HOSPITAL | Age: 57
End: 2022-02-22

## 2022-02-22 NOTE — PROGRESS NOTES
Letter mailed to patient informing her that ALESHA VUS identified in 2019 has been downgraded to benign (\"no clinical significance).

## 2022-02-23 ENCOUNTER — OFFICE VISIT (OUTPATIENT)
Dept: INTERNAL MEDICINE CLINIC | Facility: CLINIC | Age: 57
End: 2022-02-23
Payer: COMMERCIAL

## 2022-02-23 VITALS
TEMPERATURE: 98 F | HEIGHT: 66 IN | BODY MASS INDEX: 37.93 KG/M2 | HEART RATE: 69 BPM | SYSTOLIC BLOOD PRESSURE: 122 MMHG | DIASTOLIC BLOOD PRESSURE: 77 MMHG | WEIGHT: 236 LBS | RESPIRATION RATE: 16 BRPM

## 2022-02-23 DIAGNOSIS — E78.5 HYPERLIPIDEMIA, UNSPECIFIED HYPERLIPIDEMIA TYPE: ICD-10-CM

## 2022-02-23 DIAGNOSIS — Z00.00 ROUTINE PHYSICAL EXAMINATION: Primary | ICD-10-CM

## 2022-02-23 DIAGNOSIS — Z86.16 HISTORY OF 2019 NOVEL CORONAVIRUS DISEASE (COVID-19): ICD-10-CM

## 2022-02-23 DIAGNOSIS — D05.12 BREAST NEOPLASM, TIS (DCIS), LEFT: ICD-10-CM

## 2022-02-23 DIAGNOSIS — I10 ESSENTIAL HYPERTENSION: ICD-10-CM

## 2022-02-23 DIAGNOSIS — E04.1 THYROID NODULE: ICD-10-CM

## 2022-02-23 PROBLEM — K29.30 CHRONIC SUPERFICIAL GASTRITIS: Status: RESOLVED | Noted: 2020-11-24 | Resolved: 2022-02-23

## 2022-02-23 PROBLEM — K21.9 ESOPHAGEAL REFLUX: Status: RESOLVED | Noted: 2020-11-24 | Resolved: 2022-02-23

## 2022-02-23 PROBLEM — R22.1 MASS OF LEFT SIDE OF NECK: Status: RESOLVED | Noted: 2020-09-29 | Resolved: 2022-02-23

## 2022-02-23 PROCEDURE — 3078F DIAST BP <80 MM HG: CPT | Performed by: INTERNAL MEDICINE

## 2022-02-23 PROCEDURE — 3074F SYST BP LT 130 MM HG: CPT | Performed by: INTERNAL MEDICINE

## 2022-02-23 PROCEDURE — 99396 PREV VISIT EST AGE 40-64: CPT | Performed by: INTERNAL MEDICINE

## 2022-02-23 PROCEDURE — 3008F BODY MASS INDEX DOCD: CPT | Performed by: INTERNAL MEDICINE

## 2022-02-23 NOTE — ASSESSMENT & PLAN NOTE
Stable thyroid nodule, followed up per endocrinology. Ultrasound thyroid has been requested. Thyroid function test has been ordered.

## 2022-02-23 NOTE — ASSESSMENT & PLAN NOTE
History of mild COVID-19 infection on January 12, 2021. She had a fever for a few days. Mild cough, no shortness of breath or chest pain. She has had some fatigue. Chest x-ray and EKG ordered and will follow-up after completion.

## 2022-03-09 ENCOUNTER — LAB ENCOUNTER (OUTPATIENT)
Dept: LAB | Age: 57
End: 2022-03-09
Attending: INTERNAL MEDICINE
Payer: COMMERCIAL

## 2022-03-09 ENCOUNTER — HOSPITAL ENCOUNTER (OUTPATIENT)
Dept: ULTRASOUND IMAGING | Age: 57
Discharge: HOME | End: 2022-03-09
Attending: INTERNAL MEDICINE
Payer: COMMERCIAL

## 2022-03-09 DIAGNOSIS — E04.1 THYROID NODULE: ICD-10-CM

## 2022-03-09 LAB — TSI SER-ACNC: 1.1 MIU/ML (ref 0.36–3.74)

## 2022-03-09 PROCEDURE — 36415 COLL VENOUS BLD VENIPUNCTURE: CPT | Performed by: INTERNAL MEDICINE

## 2022-03-09 PROCEDURE — 84443 ASSAY THYROID STIM HORMONE: CPT | Performed by: INTERNAL MEDICINE

## 2022-03-09 PROCEDURE — 76536 US EXAM OF HEAD AND NECK: CPT | Performed by: INTERNAL MEDICINE

## 2022-03-15 ENCOUNTER — OFFICE VISIT (OUTPATIENT)
Dept: ENDOCRINOLOGY CLINIC | Facility: CLINIC | Age: 57
End: 2022-03-15
Payer: COMMERCIAL

## 2022-03-15 VITALS
HEART RATE: 78 BPM | DIASTOLIC BLOOD PRESSURE: 78 MMHG | BODY MASS INDEX: 38 KG/M2 | SYSTOLIC BLOOD PRESSURE: 125 MMHG | WEIGHT: 236 LBS

## 2022-03-15 DIAGNOSIS — E04.1 THYROID NODULE: Primary | ICD-10-CM

## 2022-03-15 PROCEDURE — 3074F SYST BP LT 130 MM HG: CPT | Performed by: INTERNAL MEDICINE

## 2022-03-15 PROCEDURE — 3078F DIAST BP <80 MM HG: CPT | Performed by: INTERNAL MEDICINE

## 2022-03-15 PROCEDURE — 99213 OFFICE O/P EST LOW 20 MIN: CPT | Performed by: INTERNAL MEDICINE

## 2022-04-19 ENCOUNTER — TELEPHONE (OUTPATIENT)
Dept: OBGYN CLINIC | Facility: CLINIC | Age: 57
End: 2022-04-19

## 2022-04-19 ENCOUNTER — OFFICE VISIT (OUTPATIENT)
Dept: OBGYN CLINIC | Facility: CLINIC | Age: 57
End: 2022-04-19
Payer: COMMERCIAL

## 2022-04-19 VITALS
HEIGHT: 66 IN | BODY MASS INDEX: 37.93 KG/M2 | WEIGHT: 236 LBS | DIASTOLIC BLOOD PRESSURE: 80 MMHG | HEART RATE: 67 BPM | SYSTOLIC BLOOD PRESSURE: 118 MMHG

## 2022-04-19 DIAGNOSIS — Z12.4 CERVICAL CANCER SCREENING: ICD-10-CM

## 2022-04-19 DIAGNOSIS — Z01.419 ENCOUNTER FOR WELL WOMAN EXAM WITH ROUTINE GYNECOLOGICAL EXAM: Primary | ICD-10-CM

## 2022-04-19 PROCEDURE — 87624 HPV HI-RISK TYP POOLED RSLT: CPT | Performed by: OBSTETRICS & GYNECOLOGY

## 2022-04-19 PROCEDURE — 3008F BODY MASS INDEX DOCD: CPT | Performed by: OBSTETRICS & GYNECOLOGY

## 2022-04-19 PROCEDURE — 3079F DIAST BP 80-89 MM HG: CPT | Performed by: OBSTETRICS & GYNECOLOGY

## 2022-04-19 PROCEDURE — 3074F SYST BP LT 130 MM HG: CPT | Performed by: OBSTETRICS & GYNECOLOGY

## 2022-04-19 PROCEDURE — 99396 PREV VISIT EST AGE 40-64: CPT | Performed by: OBSTETRICS & GYNECOLOGY

## 2022-04-19 NOTE — TELEPHONE ENCOUNTER
Per automated system pt is eligible as a dependent on husbands insurance. Pt has been eligible since 1/1/21.

## 2022-04-20 LAB — HPV I/H RISK 1 DNA SPEC QL NAA+PROBE: NEGATIVE

## 2022-06-06 ENCOUNTER — TELEPHONE (OUTPATIENT)
Dept: INTERNAL MEDICINE CLINIC | Facility: CLINIC | Age: 57
End: 2022-06-06

## 2022-06-06 NOTE — TELEPHONE ENCOUNTER
Patient calling ( identified name and  ) states  has pain to 'm yside \" and has been treated for this before under [facility]     Has has tests, EGD,  colonoscopy     Pain is located on the left side, under the last rib ;  Pain is intermittent       Denies chest pain, no difficultly breathing     Requesting to be seen for re-revaluation     Future Appointments   Date Time Provider Herbie Burt   2022 10:00 AM LYLE Bonner [facility] [address]   8/15/2022  7:40 AM PF DRISS RM1 PF [facility] [place]   2022 10:30 AM Petrona Guerra MD [facility] Surg/Onc   3/15/2023  9:00 AM Zaida Hdez MD [address] [place] [address] [address]   2023 10:30 AM Connie Avendano, DO EM[facility] OB/GYN M EMG [place]       Informed mask is required for building entry and appointment. Patient verbalizes understanding and agrees.

## 2022-06-14 ENCOUNTER — TELEPHONE (OUTPATIENT)
Dept: ENDOCRINOLOGY CLINIC | Facility: CLINIC | Age: 57
End: 2022-06-14

## 2022-06-14 NOTE — TELEPHONE ENCOUNTER
Received paperwork that patient is requesting the dx code for thyroid ultrasound be changed as insurance is not covering it.   RN faxed back form received from provider to [address] [place] [address] Billing at [phone]

## 2022-08-11 ENCOUNTER — TELEPHONE (OUTPATIENT)
Dept: INTERNAL MEDICINE CLINIC | Facility: CLINIC | Age: 57
End: 2022-08-11

## 2022-08-11 DIAGNOSIS — Z20.822 ENCOUNTER FOR LABORATORY TESTING FOR COVID-19 VIRUS: Primary | ICD-10-CM

## 2022-08-11 NOTE — TELEPHONE ENCOUNTER
Pt going on a cruise to [place] and need COVID test prior to boarding. No symptoms at this time. covid test placed per protocol. Pt given phone number to call and schedule. Pt informed results will be released through [facility] automatically. Pt verbalized understanding.

## 2022-08-15 ENCOUNTER — HOSPITAL ENCOUNTER (OUTPATIENT)
Dept: MAMMOGRAPHY | Age: 57
Discharge: HOME | End: 2022-08-15
Attending: SURGERY
Payer: COMMERCIAL

## 2022-08-15 DIAGNOSIS — D05.12 BREAST NEOPLASM, TIS (DCIS), LEFT: ICD-10-CM

## 2022-08-15 PROCEDURE — 77062 BREAST TOMOSYNTHESIS BI: CPT | Performed by: SURGERY

## 2022-08-15 PROCEDURE — 77066 DX MAMMO INCL CAD BI: CPT | Performed by: SURGERY

## 2022-09-07 ENCOUNTER — LAB ENCOUNTER (OUTPATIENT)
Dept: LAB | Age: 57
End: 2022-09-07
Attending: NURSE PRACTITIONER
Payer: COMMERCIAL

## 2022-09-07 DIAGNOSIS — Z20.822 ENCOUNTER FOR LABORATORY TESTING FOR COVID-19 VIRUS: ICD-10-CM

## 2022-09-08 LAB — SARS-COV-2 RNA RESP QL NAA+PROBE: NOT DETECTED

## 2022-09-27 ENCOUNTER — OFFICE VISIT (OUTPATIENT)
Dept: SURGERY | Facility: CLINIC | Age: 57
End: 2022-09-27

## 2022-09-27 VITALS
BODY MASS INDEX: 37.51 KG/M2 | RESPIRATION RATE: 16 BRPM | HEIGHT: 66 IN | WEIGHT: 233.38 LBS | HEART RATE: 70 BPM | OXYGEN SATURATION: 98 % | SYSTOLIC BLOOD PRESSURE: 148 MMHG | TEMPERATURE: 98 F | DIASTOLIC BLOOD PRESSURE: 90 MMHG

## 2022-09-27 DIAGNOSIS — D05.12 BREAST NEOPLASM, TIS (DCIS), LEFT: Primary | ICD-10-CM

## 2022-09-27 DIAGNOSIS — D05.12 NEOPLASM OF LEFT BREAST, PRIMARY TUMOR STAGING CATEGORY TIS: DUCTAL CARCINOMA IN SITU (DCIS): ICD-10-CM

## 2022-09-27 PROCEDURE — 99214 OFFICE O/P EST MOD 30 MIN: CPT

## 2022-09-27 PROCEDURE — 3008F BODY MASS INDEX DOCD: CPT

## 2022-09-27 PROCEDURE — 3077F SYST BP >= 140 MM HG: CPT

## 2022-09-27 PROCEDURE — 3080F DIAST BP >= 90 MM HG: CPT

## 2023-01-25 RX ORDER — AMLODIPINE BESYLATE 5 MG/1
5 TABLET ORAL DAILY
Qty: 30 TABLET | Refills: 0 | Status: SHIPPED | OUTPATIENT
Start: 2023-01-25

## 2023-01-25 NOTE — TELEPHONE ENCOUNTER
Patient is requesting refill of   amLODIPine 5 MG Oral Tab. Patient spoke with RN triage nurse Elisa Pisano. States she wants Ant Ceja to refill script instead of provider Kadie Rivers. States Ant Ceja will be her primary care provider.      Pharmacy - [place], [place] ( [facility])

## 2023-02-16 ENCOUNTER — TELEPHONE (OUTPATIENT)
Dept: ENDOCRINOLOGY CLINIC | Facility: CLINIC | Age: 58
End: 2023-02-16

## 2023-02-16 DIAGNOSIS — E04.1 THYROID NODULE: Primary | ICD-10-CM

## 2023-02-16 NOTE — TELEPHONE ENCOUNTER
Dr. Carmen Saleh    Last thyroid ultrasound done on 3/9/22. Pended order. Please advise on repeat lab work.

## 2023-02-16 NOTE — TELEPHONE ENCOUNTER
Pt is scheduled for her yearly visit on 3/15/23. Pt tried to schedule an ultrasound but there are no orders on file. Pt would like to know if she should have blood test and an Ultrasound prior to visit in March.   Please call

## 2023-03-02 ENCOUNTER — TELEPHONE (OUTPATIENT)
Facility: CLINIC | Age: 58
End: 2023-03-02

## 2023-03-08 ENCOUNTER — APPOINTMENT (OUTPATIENT)
Dept: ULTRASOUND IMAGING | Age: 58
End: 2023-03-08
Attending: INTERNAL MEDICINE
Payer: COMMERCIAL

## 2023-03-08 ENCOUNTER — LABORATORY ENCOUNTER (OUTPATIENT)
Dept: LAB | Age: 58
End: 2023-03-08
Attending: INTERNAL MEDICINE
Payer: COMMERCIAL

## 2023-03-08 ENCOUNTER — HOSPITAL ENCOUNTER (OUTPATIENT)
Dept: ULTRASOUND IMAGING | Age: 58
Discharge: HOME | End: 2023-03-08
Attending: INTERNAL MEDICINE
Payer: COMMERCIAL

## 2023-03-08 DIAGNOSIS — E04.1 THYROID NODULE: ICD-10-CM

## 2023-03-08 LAB — TSI SER-ACNC: 0.88 MIU/ML (ref 0.36–3.74)

## 2023-03-08 PROCEDURE — 36415 COLL VENOUS BLD VENIPUNCTURE: CPT | Performed by: INTERNAL MEDICINE

## 2023-03-08 PROCEDURE — 84443 ASSAY THYROID STIM HORMONE: CPT | Performed by: INTERNAL MEDICINE

## 2023-03-08 PROCEDURE — 76536 US EXAM OF HEAD AND NECK: CPT | Performed by: INTERNAL MEDICINE

## 2023-03-15 ENCOUNTER — OFFICE VISIT (OUTPATIENT)
Dept: ENDOCRINOLOGY CLINIC | Facility: CLINIC | Age: 58
End: 2023-03-15

## 2023-03-15 VITALS
WEIGHT: 234 LBS | BODY MASS INDEX: 38 KG/M2 | DIASTOLIC BLOOD PRESSURE: 88 MMHG | SYSTOLIC BLOOD PRESSURE: 144 MMHG | HEART RATE: 71 BPM

## 2023-03-15 DIAGNOSIS — E04.1 THYROID NODULE: Primary | ICD-10-CM

## 2023-03-15 PROCEDURE — 3079F DIAST BP 80-89 MM HG: CPT | Performed by: INTERNAL MEDICINE

## 2023-03-15 PROCEDURE — 99213 OFFICE O/P EST LOW 20 MIN: CPT | Performed by: INTERNAL MEDICINE

## 2023-03-15 PROCEDURE — 3077F SYST BP >= 140 MM HG: CPT | Performed by: INTERNAL MEDICINE

## (undated) DEVICE — TOWEL OR BLU 16X26 STRL

## (undated) DEVICE — BRA SURGICAL ELIZABETH PINK XL

## (undated) DEVICE — DRAPE PACK CHEST & U BAR

## (undated) DEVICE — KENDALL SCD EXPRESS SLEEVES, KNEE LENGTH, MEDIUM: Brand: KENDALL SCD

## (undated) DEVICE — SUTURE MONOCRYL 4-0 PS-2

## (undated) DEVICE — SUTURE SILK 2-0 FS

## (undated) DEVICE — HEMOCLIP HORIZON SM 001200

## (undated) DEVICE — DRAPE,TAPE STRIPS,STERILE: Brand: MEDLINE

## (undated) DEVICE — SOL  .9 1000ML BTL

## (undated) DEVICE — STERILE LATEX POWDER-FREE SURGICAL GLOVES WITH HYDROGEL COATING, SMOOTH FINISH, STRAIGHT FINGER: Brand: PROTEXIS

## (undated) DEVICE — SUTURE VICRYL 3-0 SH

## (undated) DEVICE — HEMOCLIP HORIZON MED 002200

## (undated) DEVICE — UNDERPAD INCNT 30X30IN PP HVY

## (undated) DEVICE — CLEAR MONOFILAMENT (POLYDIOXANONE), ABSORBABLE SURGICAL SUTURE: Brand: PDS

## (undated) DEVICE — BREAST-HERNIA-PORT CDS-LF: Brand: MEDLINE INDUSTRIES, INC.

## (undated) DEVICE — GOWN,SIRUS,FABRIC-REINFORCED,LARGE: Brand: MEDLINE

## (undated) DEVICE — 3M™ STERI-DRAPE™ INSTRUMENT POUCH 1018: Brand: STERI-DRAPE™

## (undated) DEVICE — DRAPE HALF 40X58 DYNJP2410

## (undated) DEVICE — CAUTERY BLADE 2IN INS E1455

## (undated) DEVICE — CONT SPEC SURG FAXITRON WEDGE

## (undated) DEVICE — ABDOMINAL PAD: Brand: DERMACEA

## (undated) NOTE — LETTER
Patient Name: Ant Donahue  : 1965  MRN: [id number]  Patient Address: [address] [place] [id number]      Coronavirus Disease 2019 (COVID-19)     [facility] is committed to the safety and well-being of our patient symptoms carefully. If your symptoms get worse, call your healthcare provider immediately. 3. Get rest and stay hydrated. 4. If you have a medical appointment, call the healthcare provider ahead of time and tell them that you have or may have COVID-19. without the use of fever-reducing medications; and  · Improvement in respiratory symptoms (e.g., cough, shortness of breath); and  · At least 10 days have passed since symptoms first appeared OR if asymptomatic patient or date of symptom onset is unclear t convalescent plasma donors must:    · Have had a confirmed diagnosis of COVID-19  · Be symptom-free for at least 14 days*    *Some people will be required to have a repeat COVID-19 test in order to be eligible to donate.  If you’re instructed by Abbi chen Post-COVID conditions to be random. Researchers are trying to identify similarities between people with a Post-COVID condition to better understand if there are risk factors. How do I prevent a Post-COVID condition?   The best way to prevent the long-t

## (undated) NOTE — ED AVS SNAPSHOT
[facility] in [address] [address]    Phone:  [phone]    Fax:  [phone]           Ms. Angel Smith   MRN: [id number]    Department:  [facility]   Date of Visit:  6/11/2017 [address] [facility], [facility] RECOVERY [place], [address]  [phone] [address]  [address]  [facility], [address] [place] [address]  [phone] [address] [place] [address] [address] [facility]   [phone]       To Check ER Wait Times:  JADEN reading, you will be contacted. Please make sure we have your correct phone number before you leave. After you leave, you should follow the attached instructions. I have read and understand the instructions given to me by my caregivers.         24-Hour Sign up for LogRhythmt, your secure online medical record. [employer] will allow you to access patient instructions from your recent visit,  view other health information, and more. To sign up or find more information, go to [email or web address] and cl

## (undated) NOTE — Clinical Note
Thank you for the consult. I saw  Ms. Betzaida Espinosa in the endocrine/diabetes clinic today. Please see attached my note. Please feel free to contact me with any questions. Thanks!

## (undated) NOTE — MR AVS SNAPSHOT
After Visit Summary   3/5/2020    Monica Kathleen    MRN: [id number]           Visit Information     Date & Time  3/5/2020  8:30 AM Provider  Valerie Loera DO [address]  Dept.  Phone  [phone] Return in about 1 year (around 3/5/2021) for annual.     We Ordered the Following     Normal Orders This Visit    HPV HIGH RISK , THIN PREP COLLECTION [[id number] CUSTOM]     SURGERY - INTERNAL [[id number] CUSTOM]     THINPREP PAP SMEAR B [[id number] CUSTOM]     T Communicate with a [facility] Physician or STEFANIE online. The physician will respond and provide   a treatment plan within a few hours.  ONLINE VISIT  Primary Care Providers  Treatment for mild illness or injury that does not require immediate attention VIDEO VISITS

## (undated) NOTE — MR AVS SNAPSHOT
After Visit Summary   3/11/2021    Dylan Contreras    MRN: [id number]           Visit Information     Date & Time  3/11/2021  8:30 AM Provider  Donald Valentin  [place]  [address]  Dept[address]  Phone  [phone] Following     Normal Orders This Visit    HPV HIGH RISK , THIN PREP COLLECTION [[id number] CUSTOM]     SURGICAL PATHOLOGY TISSUE [TIJ4006 CUSTOM]     THINPREP PAP SMEAR B [[id number] CUSTOM]     THINPREP PAP SMEAR ONLY [[id number] CUSTOM]     URINE PREGNANCY TEST [ Providers  Treatment for mild illness or injury that does not require immediate attention VIDEO VISITS  Average cost  $35*    e-VISTS  Average cost  $35*     SAME DAY APPOINTMENTS   Available at primary care offices    AFTER HOURS CARE  [place]  OFFICE VIS

## (undated) NOTE — LETTER
Concetta Nolberto Opitz, YOB: 1965    CONSENT FOR PROCEDURE/SEDATION    1. I authorize the performance upon Concetta Nolberto Opitz  the following: Endometrial biopsy procedure    2.  I authorize Dr. Rudy Graham DO (and whomever is design ____________________________________________    Witness: _________________________________________ Date:___________     Physician Signature: _______________________________ Date:___________

## (undated) NOTE — MR AVS SNAPSHOT
After Visit Summary   2/28/2017    Darylene Shams    MRN: [id number]           Visit Information        Provider Department Dept Phone    2/28/2017 11:00 AM [facility], DO Emg Gaviota Plummer Michael [phone]      Your Vitals Were     BP Ht Wt BM you do not sign up before the expiration date, you must request a new code. [employer] Activation Code: [email or web address]  Expires: 4/29/2017 11:13 AM      Enter your Zip Code and Date of Birth (mm/dd/yyyy) as indicated and click Next.  You will be taken to the

## (undated) NOTE — MR AVS SNAPSHOT
Yudy Page  [address], [facility] [address]  [address] [phone]               Thank you for choosing us for your health care visit with [facility], DO.   We are glad to serve you and happy to provide you with this [employer] will allow you to access patient instructions from your recent visit,  view other health information, and more. To sign up or find more information, go to [email or web address] and click on the Sign Up Now link in the [employer] box.      Enter 2 ½ hours per week – spread out over time Use a fiona to keep you motivated   Don’t forget strength training with weights and resistance Set goals and track your progress   You don’t need to join a gym. Home exercises work great.  Put more priority on exe